# Patient Record
Sex: MALE
[De-identification: names, ages, dates, MRNs, and addresses within clinical notes are randomized per-mention and may not be internally consistent; named-entity substitution may affect disease eponyms.]

---

## 2017-10-02 ENCOUNTER — HOSPITAL ENCOUNTER (EMERGENCY)
Dept: HOSPITAL 14 - H.ER | Age: 22
Discharge: HOME | End: 2017-10-02
Payer: COMMERCIAL

## 2017-10-02 VITALS — HEART RATE: 76 BPM | SYSTOLIC BLOOD PRESSURE: 139 MMHG | DIASTOLIC BLOOD PRESSURE: 81 MMHG | RESPIRATION RATE: 20 BRPM

## 2017-10-02 VITALS — TEMPERATURE: 97.8 F

## 2017-10-02 DIAGNOSIS — N48.9: Primary | ICD-10-CM

## 2017-10-02 LAB
BILIRUB UR-MCNC: NEGATIVE MG/DL
COLOR UR: YELLOW
GLUCOSE UR STRIP-MCNC: (no result) MG/DL
KETONES UR STRIP-MCNC: NEGATIVE MG/DL
LEUKOCYTE ESTERASE UR-ACNC: (no result) LEU/UL
PH UR STRIP: 6 [PH] (ref 5–8)
PROT UR STRIP-MCNC: NEGATIVE MG/DL
RBC # UR STRIP: NEGATIVE /UL
RBC #/AREA URNS HPF: 2 /HPF (ref 0–3)
SP GR UR STRIP: 1.03 (ref 1–1.03)
UROBILINOGEN UR-MCNC: (no result) MG/DL (ref 0.2–1)
WBC #/AREA URNS HPF: 1 /HPF (ref 0–5)

## 2017-10-02 NOTE — ED PDOC
HPI: Male  Pain


Time Seen by Provider: 10/02/17 15:12


Chief Complaint (Nursing): Male Genitourinary


Chief Complaint (Provider): penile rash


History Per: Patient


History/Exam Limitations: no limitations


Onset/Duration Of Symptoms: Days (2)


Current Symptoms Are (Timing): Still Present


Associated Symptoms: denies: Fever, Chills, Nausea, Vomiting, Urinary Symptoms


Additional Complaint(s): 





Had dysuria in August and treated as UTI with cipro. At that time he also had 

associated swelling and redness of glans penis. Improved after 1 day of cipro. 

He completed the course of cipro. Labwork also done by PMD which was negative 

for HIV, Chlamydia, Gonorrhea. He developed "thrush" while on cipro and was 

also given one dose of diflucan and it resolved.





Over last 3 days feels like throat symptoms coming back, and noted red bumps to 

glans near meatus, but no pain or burning.





PMD Stacey





Past Medical History


Reviewed: Historical Data, Nursing Documentation, Vital Signs


Vital Signs: 





 Last Vital Signs











Temp  97.8 F   10/02/17 14:58


 


Pulse  78   10/02/17 14:58


 


Resp  15   10/02/17 14:58


 


BP  177/176 H  10/02/17 14:58


 


Pulse Ox  100   10/02/17 14:58














- Medical History


PMH: No Chronic Diseases





- Surgical History


Surgical History: No Surg Hx





- Family History


Family History: States: No Known Family Hx





- Allergies


Allergies/Adverse Reactions: 


 Allergies











Allergy/AdvReac Type Severity Reaction Status Date / Time


 


ciprofloxacin [From Cipro] Allergy  RASH Verified 10/02/17 14:58














Review of Systems


ROS Statement: Except As Marked, All Systems Reviewed And Found Negative (and 

as per HPI)


ENT: Positive for: Throat Pain


Gastrointestinal: Negative for: Nausea, Vomiting, Abdominal Pain, Diarrhea


Skin: Positive for: Lesions





Physical Exam





- Reviewed


Nursing Documentation Reviewed: Yes


Vital Signs Reviewed: Yes





- Physical Exam


Appears: Positive for: Non-toxic, No Acute Distress


Head Exam: Positive for: ATRAUMATIC, NORMOCEPHALIC


Skin: Positive for: Warm, Dry


ENT: Positive for: Pharynx Is (clear).  Negative for: Pharyngeal Erythema, 

Tonsillar Exudate, Tonsillar Swelling


Gastrointestinal/Abdominal: Positive for: Soft.  Negative for: Tenderness


Male Genital Exam: Positive for: normal genitalia, normal prostate, lesions (

pinpoint flesh colored lesions on glans near meatus, appears to normal skin 

topography).  Negative for: bleeding, erythema, testicular tenderness (R), 

testicular tenderness (L), urethral discharge


Lymphatic: Negative for: Adenopathy


Neurologic/Psych: Positive for: Alert.  Negative for: Motor/Sensory Deficits





- ECG


O2 Sat by Pulse Oximetry: 100





Disposition





- Clinical Impression


Clinical Impression: 


 Penile lesion





Counseled Patient/Family Regarding: Studies Performed, Diagnosis, Need For 

Followup





- Disposition


Referrals: 


González Ibarra [Family Provider] - 10/05/17


Disposition: Routine/Home


Disposition Time: 16:30


Condition: FAIR


Additional Instructions: 


YOUR URINE TODAY WAS NORMAL


YOU WILL BE CONTACTED IF YOUR BLOOD RESULTS ARE ABNORMAL.





PLEASE FOLLOW UP WITH YOUR DOCTOR IN 2-3 DAYS.


Instructions:  Dysuria (ED)

## 2017-10-03 VITALS — OXYGEN SATURATION: 100 %

## 2017-10-29 ENCOUNTER — HOSPITAL ENCOUNTER (EMERGENCY)
Dept: HOSPITAL 14 - H.ER | Age: 22
LOS: 1 days | Discharge: HOME | End: 2017-10-30
Payer: COMMERCIAL

## 2017-10-29 VITALS
DIASTOLIC BLOOD PRESSURE: 94 MMHG | RESPIRATION RATE: 18 BRPM | SYSTOLIC BLOOD PRESSURE: 147 MMHG | TEMPERATURE: 98.9 F | HEART RATE: 77 BPM | OXYGEN SATURATION: 100 %

## 2017-10-29 DIAGNOSIS — B37.9: Primary | ICD-10-CM

## 2017-10-30 NOTE — ED PDOC
HPI: Male  Pain


Time Seen by Provider: 10/29/17 23:15


Chief Complaint (Nursing): Male Genitourinary


Chief Complaint (Provider): rash


History Per: Patient


History/Exam Limitations: no limitations


Associated Symptoms: denies: Fever, Chills, Nausea, Vomiting, Diarrhea, Loss Of 

Appetite, Back Pain, Chest Pain, Constipation, Urinary Symptoms


Additional Complaint(s): 





23yo M in ED for eval of rash to penis x 3 months-states that he was dx with 

yeast infection to glans of penis. had negative STI testing since his last 

sexual intercounter. no fever, pelvic pain nausea or vomiting. denies back pain 

or hemautira. admits to itching to penis and occasional burning with urination. 





Past Medical History


Reviewed: Historical Data, Nursing Documentation, Vital Signs


Vital Signs: 





 Last Vital Signs











Temp  98.9 F   10/29/17 23:28


 


Pulse  77   10/29/17 23:28


 


Resp  18   10/29/17 23:28


 


BP  147/94 H  10/29/17 23:28


 


Pulse Ox  100   10/29/17 23:28














- Medical History


PMH: No Chronic Diseases





- Family History


Family History: States: No Known Family Hx





- Home Medications


Home Medications: 


 Ambulatory Orders











 Medication  Instructions  Recorded


 


Fluconazole [Diflucan] 150 mg PO ONCE #4 tab 10/30/17


 


Nystatin [Mycostatin Cream] 1 applic EXT DAILY #1 tube 10/30/17














- Allergies


Allergies/Adverse Reactions: 


 Allergies











Allergy/AdvReac Type Severity Reaction Status Date / Time


 


apple Allergy  ANAPHYLAXIS Verified 10/29/17 23:28


 


ciprofloxacin [From Cipro] Allergy  RASH Verified 10/02/17 14:58














Review of Systems


ROS Statement: Except As Marked, All Systems Reviewed And Found Negative


Constitutional: Negative for: Fever, Chills


Genitourinary Male: Positive for: Rash.  Negative for: Dysuria, Frequency, 

Incontinence, Hematuria, Penile Discharge, Scrotal Pain, Penile Pain





Physical Exam





- Reviewed


Nursing Documentation Reviewed: Yes


Vital Signs Reviewed: Yes





- Physical Exam


Appears: Positive for: Well, Non-toxic, No Acute Distress


Skin: Positive for: Normal Color, Warm, DRY


Cardiovascular/Chest: Positive for: Regular Rate, Rhythm


Respiratory: Positive for: CNT, Normal Breath Sounds


Gastrointestinal/Abdominal: Positive for: Normal Exam, Bowel Sounds, Soft.  

Negative for: Tenderness


Male Genital Exam: Positive for: other (pt is not circumsised. ).  Negative for

: bleeding, epididymal tenderness, erythema, hernia mass, high riding prostate, 

inguinal tenderness, lesions, scrotum tenderness (R), scrotum tenderness (L), 

testicular tenderness (R), testicular tenderness (L), urethral discharge


Back: Positive for: Normal Inspection


Neurologic/Psych: Positive for: Alert, Oriented





- ECG


O2 Sat by Pulse Oximetry: 100





Medical Decision Making


Medical Decision Making: 





pt most liekly with yeast infection-will treat with diflucan and nystain with 

f.u with urologist. 





Disposition





- Clinical Impression


Clinical Impression: 


 Yeast infection








- Patient ED Disposition


Is Patient to be Admitted: No


Counseled Patient/Family Regarding: Diagnosis, Need For Followup, Rx Given





- Disposition


Referrals: 


González Ibarra [Primary Care Provider] - 


Disposition: Routine/Home


Disposition Time: 00:50


Condition: STABLE


Prescriptions: 


Fluconazole [Diflucan] 150 mg PO ONCE #4 tab


Nystatin [Mycostatin Cream] 1 applic EXT DAILY #1 tube


Instructions:  Skin Yeast Infection (ED)

## 2017-12-30 ENCOUNTER — HOSPITAL ENCOUNTER (INPATIENT)
Dept: HOSPITAL 14 - H.ER | Age: 22
LOS: 7 days | Discharge: HOME | DRG: 347 | End: 2018-01-06
Attending: HOSPITALIST | Admitting: HOSPITALIST
Payer: COMMERCIAL

## 2017-12-30 DIAGNOSIS — Q43.0: Primary | ICD-10-CM

## 2017-12-30 DIAGNOSIS — K50.90: ICD-10-CM

## 2017-12-30 DIAGNOSIS — N50.89: ICD-10-CM

## 2017-12-30 DIAGNOSIS — R42: ICD-10-CM

## 2017-12-30 DIAGNOSIS — K56.2: ICD-10-CM

## 2017-12-30 DIAGNOSIS — D72.829: ICD-10-CM

## 2017-12-30 DIAGNOSIS — R53.1: ICD-10-CM

## 2017-12-30 DIAGNOSIS — K56.690: ICD-10-CM

## 2017-12-30 DIAGNOSIS — I95.1: ICD-10-CM

## 2017-12-30 LAB
ALBUMIN SERPL-MCNC: 4.9 G/DL (ref 3.5–5)
ALBUMIN/GLOB SERPL: 1.6 {RATIO} (ref 1–2.1)
ALT SERPL-CCNC: 43 U/L (ref 21–72)
APTT BLD: 28 SECONDS (ref 25.6–37.1)
AST SERPL-CCNC: 16 U/L (ref 17–59)
BACTERIA #/AREA URNS HPF: (no result) /[HPF]
BASOPHILS # BLD AUTO: 0.1 K/UL (ref 0–0.2)
BASOPHILS NFR BLD: 0.4 % (ref 0–2)
BILIRUB UR-MCNC: NEGATIVE MG/DL
BUN SERPL-MCNC: 11 MG/DL (ref 9–20)
CALCIUM SERPL-MCNC: 9.5 MG/DL (ref 8.4–10.2)
COLOR UR: YELLOW
EOSINOPHIL # BLD AUTO: 0 K/UL (ref 0–0.7)
EOSINOPHIL NFR BLD: 0.1 % (ref 0–4)
ERYTHROCYTE [DISTWIDTH] IN BLOOD BY AUTOMATED COUNT: 12.7 % (ref 11.5–14.5)
GFR NON-AFRICAN AMERICAN: > 60
GIANT PLATELETS BLD QL SMEAR: PRESENT
GLUCOSE UR STRIP-MCNC: (no result) MG/DL
HGB BLD-MCNC: 15.7 G/DL (ref 12–18)
INR PPP: 1 (ref 0.9–1.2)
LEUKOCYTE ESTERASE UR-ACNC: (no result) LEU/UL
LG PLATELETS BLD QL SMEAR: PRESENT
LIPASE SERPL-CCNC: 57 U/L (ref 23–300)
LYMPHOCYTE: 10 % (ref 20–50)
LYMPHOCYTES # BLD AUTO: 0.9 K/UL (ref 1–4.3)
LYMPHOCYTES NFR BLD AUTO: 6 % (ref 20–40)
MCH RBC QN AUTO: 30.5 PG (ref 27–31)
MCHC RBC AUTO-ENTMCNC: 34.8 G/DL (ref 33–37)
MCV RBC AUTO: 87.7 FL (ref 80–94)
MONOCYTE: 4 % (ref 0–10)
MONOCYTES # BLD: 0.5 K/UL (ref 0–0.8)
MONOCYTES NFR BLD: 2.9 % (ref 0–10)
NEUTROPHILS # BLD: 14.4 K/UL (ref 1.8–7)
NEUTROPHILS NFR BLD AUTO: 85 % (ref 42–75)
NEUTROPHILS NFR BLD AUTO: 90.6 % (ref 50–75)
NEUTS BAND NFR BLD: 1 % (ref 0–2)
NRBC BLD AUTO-RTO: 0 % (ref 0–0)
PH UR STRIP: 6 [PH] (ref 5–8)
PLATELET # BLD EST: NORMAL 10*3/UL
PLATELET # BLD: 226 K/UL (ref 130–400)
PMV BLD AUTO: 9.5 FL (ref 7.2–11.7)
PROT UR STRIP-MCNC: NEGATIVE MG/DL
PROTHROMBIN TIME: 11 SECONDS (ref 9.8–13.1)
RBC # BLD AUTO: 5.13 MIL/UL (ref 4.4–5.9)
RBC # UR STRIP: NEGATIVE /UL
SP GR UR STRIP: 1.02 (ref 1–1.03)
SQUAMOUS EPITHIAL: 1 /HPF (ref 0–5)
TOTAL CELLS COUNTED BLD: 100
URINE CLARITY: (no result)
URINE NITRATE: NEGATIVE
UROBILINOGEN UR-MCNC: (no result) MG/DL (ref 0.2–1)
WBC # BLD AUTO: 15.9 K/UL (ref 4.8–10.8)

## 2017-12-30 NOTE — CT
PROCEDURE:  CT Abdomen and Pelvis with contrast



HISTORY:

diffuse severe abd pain, guarding



COMPARISON:

None.



TECHNIQUE:

Contrast dose: 95 mL Omnipaque 300



Radiation dose:



Total exam DLP = 730.24 mGy-cm.



This CT exam was performed using one or more of the following dose 

reduction techniques: Automated exposure control, adjustment of the 

mA and/or kV according to patient size, and/or use of iterative 

reconstruction technique.



FINDINGS:



LOWER THORAX:

Unremarkable. 



LIVER:

Unremarkable. No gross lesion or ductal dilatation. 



GALLBLADDER AND BILE DUCTS:

Unremarkable. 



PANCREAS:

Unremarkable. No gross lesion or ductal dilatation.



SPLEEN:

Unremarkable. 



ADRENALS:

Unremarkable. No mass. 



KIDNEYS AND URETERS:

Unremarkable. No hydronephrosis. No solid mass. 



VASCULATURE:

Unremarkable. No aortic aneurysm. 



BOWEL:

There is probable mechanical small bowel obstruction.  Dilated loops 

of small bowel with liquified content are seen with a transition 

point identified in the lower central abdomen, best visualized on 

images 103-106. This is in the mid ileum. Collapsed ileal loops are 

seen distal to this point.  There is edema of the ileal mesentery. l 

there is mild sigmoid diverticulosis.  There is no evidence of 

diverticulitis.  There are no other abnormal bowel loops identified.



APPENDIX:

Normal appendix. 



PERITONEUM:

Unremarkable. No free fluid. No free air. 



LYMPH NODES:

Shotty subcentimeter lymph nodes are identified within the small 

bowel mesenteric and medial to the cecum and ascending colon.  There 

is no retroperitoneal or pelvic lymphadenopathy. 



BLADDER:

Unremarkable. 



REPRODUCTIVE:

Normal prostate 



BONES:

No acute fracture. 



OTHER FINDINGS:

None.



IMPRESSION:

Findings consistent with mechanical small bowel obstruction of the 

mid ileal region. No evidence of bowel perforation. No other acute 

abnormality identified.

## 2017-12-30 NOTE — ED PDOC
HPI: Abdomen


Time Seen by Provider: 12/30/17 07:15


Chief Complaint (Nursing): Abdominal Pain


Chief Complaint (Provider): Abdominal pain


History Per: Patient


History/Exam Limitations: no limitations


Onset/Duration Of Symptoms: Hrs (5 hours ago)


Current Symptoms Are (Timing): Still Present


Severity: Severe


Additional Complaint(s): 


21 y/o male presents to the ED with severe central abdominal pain, radiating to 

the epigastric, onset of 5 hours. Patient has experienced several episodes of 

vomiting and has been unable to move his bowels since yesterday. He also 

reports of having similar pain in the past. He denies fever, urinary symptoms, 

or back pain. 





PCP: González Ibarra








Past Medical History


Reviewed: Historical Data, Nursing Documentation, Vital Signs


Vital Signs: 


 Last Vital Signs











Temp  97.1 F L  12/30/17 07:16


 


Pulse  82   12/30/17 07:16


 


Resp  18   12/30/17 07:22


 


BP  152/92 H  12/30/17 07:16


 


Pulse Ox  100   12/30/17 10:26














- Medical History


PMH: No Chronic Diseases





- Surgical History


Surgical History: No Surg Hx





- Family History


Family History: States: Unknown Family Hx





- Social History


Current smoker - smoking cessation education provided: No


Ex-Smoker (has not smoked in the last 12 months): No


Alcohol: None


Drugs: Denies





- Home Medications


Home Medications: 


 Ambulatory Orders











 Medication  Instructions  Recorded


 


No Known Home Med  12/30/17














- Allergies


Allergies/Adverse Reactions: 


 Allergies











Allergy/AdvReac Type Severity Reaction Status Date / Time


 


apple Allergy  ANAPHYLAXIS Verified 12/30/17 07:22


 


ciprofloxacin [From Cipro] Allergy  RASH Verified 12/30/17 07:22














Review of Systems


ROS Statement: Except As Marked, All Systems Reviewed And Found Negative


Constitutional: Negative for: Fever


Gastrointestinal: Positive for: Vomiting, Abdominal Pain (central, radiating to 

epigastirc), Constipation


Genitourinary Male: Negative for: Dysuria


Musculoskeletal: Negative for: Back Pain





Physical Exam





- Reviewed


Nursing Documentation Reviewed: Yes


Vital Signs Reviewed: Yes





- Physical Exam


Appears: Positive for: Non-toxic, In Acute Distress (in painful distress)


Head Exam: Positive for: ATRAUMATIC


Skin: Positive for: Normal Color, Warm


Eye Exam: Positive for: Normal appearance, EOMI, PERRL


ENT: Positive for: Normal ENT Inspection


Neck: Positive for: Normal, Painless ROM, Supple


Cardiovascular/Chest: Positive for: Regular Rate, Rhythm.  Negative for: Murmur


Respiratory: Positive for: Normal Breath Sounds.  Negative for: Respiratory 

Distress


Gastrointestinal/Abdominal: Positive for: Normal Exam, Tenderness (diffusely 

tender), Guarding


Back: Positive for: Normal Inspection


Extremity: Positive for: Normal ROM.  Negative for: Pedal Edema, Deformity


Neurologic/Psych: Positive for: Alert, Oriented.  Negative for: Motor/Sensory 

Deficits





- Laboratory Results


Result Diagrams: 


 12/30/17 08:25





 12/30/17 08:25





- ECG


O2 Sat by Pulse Oximetry: 100 (RA)


Pulse Ox Interpretation: Normal





Medical Decision Making


Medical Decision Making: 


Time:


--07:44


Impression: 


--21 y/o with abdominal pain


Plan:


--CT ABD & Pelvis IV Contrast


--Labs


--Ed Urine Dip


--Partial Thrombobin Time


--Prothrombin time


--chest X-ray


--Morphine 2mg IV


--Iv fluids


--Zofran 4mg


--Glucose, Blood, POC


--Urinalysis





labs reviewed, reveal elevated WBC, normal Hgb, lipase and LFTs





CT:





Accession No. : [id number]


Patient Name / ID : LUIS M BERMUDEZ  / [id number]


Exam Date : 12/30/2017 09:40:09 ( Approved )


Study Comment : 


Sex / Age : M  / 22Y





Creator : Ranjeet Chávez MD


Dictator : Ranjeet Chávez MD


 : 


Approver : Ranjeet Chávez MD


Approver2 : 





Report Date : 12/30/2017 10:23:43


My Comment : 


********************************************************************************

***





PROCEDURE:  CT Abdomen and Pelvis with contrast





HISTORY:


diffuse severe abd pain, guarding





COMPARISON:


None.





TECHNIQUE:


Contrast dose: 95 mL Omnipaque 300





Radiation dose:





Total exam DLP = 730.24 mGy-cm.





This CT exam was performed using one or more of the following dose reduction 

techniques: Automated exposure control, adjustment of the mA and/or kV 

according to patient size, and/or use of iterative reconstruction technique.





FINDINGS:





LOWER THORAX:


Unremarkable. 





LIVER:


Unremarkable. No gross lesion or ductal dilatation. 





GALLBLADDER AND BILE DUCTS:


Unremarkable. 





PANCREAS:


Unremarkable. No gross lesion or ductal dilatation.





SPLEEN:


Unremarkable. 





ADRENALS:


Unremarkable. No mass. 





KIDNEYS AND URETERS:


Unremarkable. No hydronephrosis. No solid mass. 





VASCULATURE:


Unremarkable. No aortic aneurysm. 





BOWEL:


There is probable mechanical small bowel obstruction.  Dilated loops of small 

bowel with liquified content are seen with a transition point identified in the 

lower central abdomen, best visualized on images 103-106. This is in the mid 

ileum. Collapsed ileal loops are seen distal to this point.  There is edema of 

the ileal mesentery. l there is mild sigmoid diverticulosis.  There is no 

evidence of diverticulitis.  There are no other abnormal bowel loops identified.





APPENDIX:


Normal appendix. 





PERITONEUM:


Unremarkable. No free fluid. No free air. 





LYMPH NODES:


Shotty subcentimeter lymph nodes are identified within the small bowel 

mesenteric and medial to the cecum and ascending colon.  There is no 

retroperitoneal or pelvic lymphadenopathy. 





BLADDER:


Unremarkable. 





REPRODUCTIVE:


Normal prostate 





BONES:


No acute fracture. 





OTHER FINDINGS:


None.





IMPRESSION:


Findings consistent with mechanical small bowel obstruction of the mid ileal 

region. No evidence of bowel perforation. No other acute abnormality identified.





-------------------------------








NGT ordered


Reassess- required additional pain medicine.


1030a d/w surgical resident 


1050a d/w attending surgeon Dr Menendez, clinical presentation, lab results and CT 

findings discussed. He recommended GI consult for possibility crohns disease. 











Scribe Attestation:


Documented by Sekou Fisher acting as a scribe for Benji Garcia DO. 








Disposition





- Disposition

## 2017-12-30 NOTE — RAD
HISTORY:

SOB  



COMPARISON:

No prior. 



FINDINGS:



LUNGS:

No active pulmonary disease.



PLEURA:

No significant pleural effusion identified, no pneumothorax apparent.



CARDIOVASCULAR:

Normal.



OSSEOUS STRUCTURES:

No significant abnormalities.



VISUALIZED UPPER ABDOMEN:

Normal.



OTHER FINDINGS:

None.



IMPRESSION:

No active disease.

## 2017-12-30 NOTE — CP.PCM.CON
History of Present Illness





- History of Present Illness


History of Present Illness: 





General Surgery:  Dr Menendez





pt is a 22M with no PMH who presents with sudden onset abdominal pain.  Pt 

states he was sleeping and at 2AM was awoken by intense cramping abdominal pain

, located mainly in the suprapubic region by radiating up to the epigastrium, 10

/10.  Pt states he also had 3 episodes of emesis, primarily consisting of 

saliva with minimal chunks of previously eaten food.  He denies any bile in the 

vomit but does state there were small streaks of blood after the 2nd time.  He 

had one episode of emesis in the ED that again was just saliva.  Pt states he 

last had a BM yesterday morning, and was passing gas throughout the day.  

Denies any f/c, sob or chest pain.  Per ED physician pt was in extreme pain and 

guarding upon presentation.  He has since been medicated for his discomfort.  

Pt reports pain is still present but now a 4/10.  He was able to doze off after 

pain medication.





PMH:  none


PSH: none


No fam hx of GI related issues including but not limited to Crohn's, UC or 

cancer.





Review of Systems





- Review of Systems


All systems: reviewed and no additional remarkable complaints except (as per HPI

)





Past Patient History





- Past Social History


Alcohol: None


Drugs: Denies





- PSYCHIATRIC


Hx Substance Use: No





- SURGICAL HISTORY


Hx Surgeries: No





- ANESTHESIA


Hx Anesthesia: No





Meds


Allergies/Adverse Reactions: 


 Allergies











Allergy/AdvReac Type Severity Reaction Status Date / Time


 


apple Allergy  ANAPHYLAXIS Verified 12/30/17 07:22


 


ciprofloxacin [From Cipro] Allergy  RASH Verified 12/30/17 07:22














- Medications


Medications: 


 Current Medications





Lactated Ringer's (Lactated Ringer's)  1,000 mls @ 100 mls/hr IV .Q10H ANNA


Morphine Sulfate (Morphine)  4 mg IVP Q4 PRN


   PRN Reason: Pain, severe (8-10)


Morphine Sulfate (Morphine)  2 mg IVP Q4 PRN


   PRN Reason: Pain, moderate (4-7)


Ondansetron HCl (Zofran Inj)  4 mg IVP Q6 PRN


   PRN Reason: Nausea/Vomiting











Physical Exam





- Constitutional


Appears: Non-toxic, No Acute Distress





- Head Exam


Head Exam: NORMAL INSPECTION





- ENT Exam


ENT Exam: Mucous Membranes Moist





- Respiratory Exam


Respiratory Exam: absent: Accessory Muscle Use, Respiratory Distress





- Cardiovascular Exam


Cardiovascular Exam: REGULAR RHYTHM.  absent: Tachycardia





- GI/Abdominal Exam


GI & Abdominal Exam: Distended (minimally), Soft, Tenderness (suprapubic and 

RLQ mainly).  absent: Firm, Guarding, Hernia, Mass, Rebound, Rigid





- Rectal Exam


Rectal Exam: Deferred





- Extremities Exam


Extremities exam: Negative for: calf tenderness, pedal edema





- Back Exam


Back exam: absent: CVA tenderness (L), CVA tenderness (R)





- Neurological Exam


Neurological exam: Alert, Oriented x3





- Psychiatric Exam


Psychiatric exam: Normal Affect, Normal Mood





- Skin


Skin Exam: Dry, Intact, Normal Color, Warm





Results





- Vital Signs


Recent Vital Signs: 


 Last Vital Signs











Temp  97.1 F L  12/30/17 07:16


 


Pulse  82   12/30/17 07:16


 


Resp  18   12/30/17 07:22


 


BP  152/92 H  12/30/17 07:16


 


Pulse Ox  100   12/30/17 10:57














- Labs


Result Diagrams: 


 12/30/17 08:25





 12/30/17 08:25


Labs: 


 Laboratory Results - last 24 hr











  12/30/17 12/30/17 12/30/17





  08:25 08:25 08:25


 


WBC  15.9 H  


 


RBC  5.13  


 


Hgb  15.7  


 


Hct  45.0  


 


MCV  87.7  


 


MCH  30.5  


 


MCHC  34.8  


 


RDW  12.7  


 


Plt Count  226  


 


MPV  9.5  


 


Neut % (Auto)  90.6 H  


 


Lymph % (Auto)  6.0 L  


 


Mono % (Auto)  2.9  


 


Eos % (Auto)  0.1  


 


Baso % (Auto)  0.4  


 


Neut #  14.4 H  


 


Lymph #  0.9 L  


 


Mono #  0.5  


 


Eos #  0.0  


 


Baso #  0.1  


 


Neutrophils % (Manual)  85 H  


 


Band Neutrophils %  1  


 


Lymphocytes % (Manual)  10 L  


 


Monocytes % (Manual)  4  


 


Platelet Estimate  Normal  


 


Large Platelets  Present  


 


Giant Platelets  Present  


 


PT    11.0


 


INR    1.0


 


APTT    28.0


 


Sodium   136 


 


Potassium   3.7 


 


Chloride   99 


 


Carbon Dioxide   27 


 


Anion Gap   14 


 


BUN   11 


 


Creatinine   0.7 L 


 


Est GFR ( Amer)   > 60 


 


Est GFR (Non-Af Amer)   > 60 


 


Random Glucose   138 H 


 


Calcium   9.5 


 


Total Bilirubin   0.8 


 


AST   16 L 


 


ALT   43 


 


Alkaline Phosphatase   85 


 


Total Protein   8.0 


 


Albumin   4.9 


 


Globulin   3.1 


 


Albumin/Globulin Ratio   1.6 


 


Lipase   57 


 


Urine Color   


 


Urine Clarity   


 


Urine pH   


 


Ur Specific Gravity   


 


Urine Protein   


 


Urine Glucose (UA)   


 


Urine Ketones   


 


Urine Blood   


 


Urine Nitrate   


 


Urine Bilirubin   


 


Urine Urobilinogen   


 


Ur Leukocyte Esterase   


 


Urine Microscopic WBC   


 


Ur Squamous Epith Cells   


 


Urine Bacteria   














  12/30/17





  08:25


 


WBC 


 


RBC 


 


Hgb 


 


Hct 


 


MCV 


 


MCH 


 


MCHC 


 


RDW 


 


Plt Count 


 


MPV 


 


Neut % (Auto) 


 


Lymph % (Auto) 


 


Mono % (Auto) 


 


Eos % (Auto) 


 


Baso % (Auto) 


 


Neut # 


 


Lymph # 


 


Mono # 


 


Eos # 


 


Baso # 


 


Neutrophils % (Manual) 


 


Band Neutrophils % 


 


Lymphocytes % (Manual) 


 


Monocytes % (Manual) 


 


Platelet Estimate 


 


Large Platelets 


 


Giant Platelets 


 


PT 


 


INR 


 


APTT 


 


Sodium 


 


Potassium 


 


Chloride 


 


Carbon Dioxide 


 


Anion Gap 


 


BUN 


 


Creatinine 


 


Est GFR ( Amer) 


 


Est GFR (Non-Af Amer) 


 


Random Glucose 


 


Calcium 


 


Total Bilirubin 


 


AST 


 


ALT 


 


Alkaline Phosphatase 


 


Total Protein 


 


Albumin 


 


Globulin 


 


Albumin/Globulin Ratio 


 


Lipase 


 


Urine Color  Yellow


 


Urine Clarity  Slighty-cloudy


 


Urine pH  6.0


 


Ur Specific Gravity  1.018


 


Urine Protein  Negative


 


Urine Glucose (UA)  Neg


 


Urine Ketones  Negative


 


Urine Blood  Negative


 


Urine Nitrate  Negative


 


Urine Bilirubin  Negative


 


Urine Urobilinogen  0.2-1.0


 


Ur Leukocyte Esterase  Neg


 


Urine Microscopic WBC  1


 


Ur Squamous Epith Cells  1


 


Urine Bacteria  Rare














Assessment & Plan





- Assessment and Plan (Free Text)


Assessment: 





22M with undifferentiated abdominal pain;  ddx enteritis vs Crohn's vs sbo


Plan: 





pt last had BM yesterday and emesis has been non-billous; unlikely fully 

obstructed


   -will hold off on NGT unless further vomiting occurs


   -NPO, IV Fluids


consideration to first episode of Crohn's should be given; especially with 

stricture noted on CT


   -recommend GI consult for Crohn's work-up and possible colonoscopy in the 

future


No immediate surgical intervention planned





d/w Dr Shon Gamez, PGY3

## 2017-12-31 LAB
ALBUMIN SERPL-MCNC: 4.4 G/DL (ref 3.5–5)
ALBUMIN/GLOB SERPL: 1.4 {RATIO} (ref 1–2.1)
ALT SERPL-CCNC: 36 U/L (ref 21–72)
AST SERPL-CCNC: 13 U/L (ref 17–59)
BUN SERPL-MCNC: 8 MG/DL (ref 9–20)
CALCIUM SERPL-MCNC: 9.5 MG/DL (ref 8.4–10.2)
ERYTHROCYTE [DISTWIDTH] IN BLOOD BY AUTOMATED COUNT: 12.6 % (ref 11.5–14.5)
GFR NON-AFRICAN AMERICAN: > 60
HGB BLD-MCNC: 15.9 G/DL (ref 12–18)
MCH RBC QN AUTO: 30.3 PG (ref 27–31)
MCHC RBC AUTO-ENTMCNC: 34.4 G/DL (ref 33–37)
MCV RBC AUTO: 87.9 FL (ref 80–94)
PLATELET # BLD: 212 K/UL (ref 130–400)
RBC # BLD AUTO: 5.25 MIL/UL (ref 4.4–5.9)
WBC # BLD AUTO: 11.5 K/UL (ref 4.8–10.8)

## 2017-12-31 NOTE — CP.PCM.HP
History of Present Illness





- History of Present Illness


History of Present Illness: 





CC: Abdominal pain





This is a 22 year old male with no significant past medical history who came 

into the ED complaining of sudden onset abdominal pain located in the 

epigastrium, starting 5 hours before arrival to the ED. The patient states that 

the pain is severe, sharp, and constant. He says the pain is associated with 

nonbilious emesis. He vomited several times, once with blood tinged emesis. He 

says he noticed only trace amount of blood the first time he vomited and 

afterwards did not noticed any blood. He reports having similar pain like this 

in the past. He denies any associated fever, cp, sob, back pain, or urinary sx. 

In the ED, the patient was unable to tolerate any po. A CT scan was done 

revealing mechanical SBO located in the ileum. Surgery saw the patient and 

stated there was no need for NG tube at this time and will continue to monitor. 

Dr. Holliday was called for GI as well as there was concern for possible Crohn'

s stricture. The patient has never had abdominal surgery in the past. 





Present on Admission





- Present on Admission


Any Indicators Present on Admission: No





Review of Systems





- Review of Systems


Review of Systems: 





A 12 point review of systems was conducted and found to be negative other than 

what was mentioned in the HPI. 





Past Patient History





- Infectious Disease


Hx of Infectious Diseases: None





- Past Medical History & Family History


Past Medical History?: No





- Past Social History


Smoking Status: Never Smoked





- CARDIAC


Hx Cardiac Disorders: No





- PULMONARY


Hx Respiratory Disorders: No





- NEUROLOGICAL


Hx Neurological Disorder: No





- HEENT


Hx HEENT Problems: No





- RENAL


Hx Chronic Kidney Disease: No





- ENDOCRINE/METABOLIC


Hx Endocrine Disorders: No





- HEMATOLOGICAL/ONCOLOGICAL


Hx Blood Disorders: No





- INTEGUMENTARY


Hx Dermatological Problems: No





- MUSCULOSKELETAL/RHEUMATOLOGICAL


Hx Musculoskeletal Disorders: No


Hx Falls: No





- GASTROINTESTINAL


Hx Gastrointestinal Disorders: No





- GENITOURINARY/GYNECOLOGICAL


Hx Genitourinary Disorders: No





- PSYCHIATRIC


Hx Psychophysiologic Disorder: No


Hx Substance Use: No





- SURGICAL HISTORY


Hx Surgeries: No





- ANESTHESIA


Hx Anesthesia: No





Meds


Allergies/Adverse Reactions: 


 Allergies











Allergy/AdvReac Type Severity Reaction Status Date / Time


 


apple Allergy  ANAPHYLAXIS Verified 12/30/17 07:22


 


ciprofloxacin [From Cipro] Allergy  RASH Verified 12/30/17 07:22














Physical Exam





- Additional Findings


Additional findings: 








Physical exam:





Constitutional- cooperative, awake, alert


Head- NCAT, PERRL


Eye- PERRL, EOMI


ENT- normal exam, MMM.


Neck- normal inspection, supple, no JVD


Respiratory- CTAB, no wheezes rales rhonchi


Cardiovascular- RRR, +S1, +S2 no MRG


GI/Abdominal- + Tenderness to palpation of the epigastrium. hypoactive bowel 

sounds, soft, no mass, no hsm


Skin- warm, dry. + Multiple tattoos


Extremities Exam- normal capillary refill, normal inspection


Neurological Exam- alert, awake, oriented


Psych- normal mood, normal affect





Results





- Vital Signs


Recent Vital Signs: 





 Last Vital Signs











Temp  98.8 F   12/31/17 16:00


 


Pulse  89   12/31/17 16:00


 


Resp  18   12/31/17 16:00


 


BP  141/84   12/31/17 16:00


 


Pulse Ox  97   12/31/17 16:00














- Labs


Result Diagrams: 


 12/31/17 05:30





 12/31/17 05:30


Labs: 





 Laboratory Results - last 24 hr











  12/31/17 12/31/17





  05:30 05:30


 


WBC  11.5 H 


 


RBC  5.25 


 


Hgb  15.9 


 


Hct  46.1 


 


MCV  87.9 


 


MCH  30.3 


 


MCHC  34.4 


 


RDW  12.6 


 


Plt Count  212 


 


Sodium   135


 


Potassium   4.0


 


Chloride   98


 


Carbon Dioxide   30


 


Anion Gap   11


 


BUN   8 L


 


Creatinine   0.7 L


 


Est GFR ( Amer)   > 60


 


Est GFR (Non-Af Amer)   > 60


 


Random Glucose   114 H


 


Calcium   9.5


 


Total Bilirubin   1.5 H


 


AST   13 L


 


ALT   36


 


Alkaline Phosphatase   75


 


Total Protein   7.4


 


Albumin   4.4


 


Globulin   3.1


 


Albumin/Globulin Ratio   1.4














Assessment & Plan





- Assessment and Plan (Free Text)


Plan: 





ASSESSMENT/PLAN





1) Mechanical SBO, r/o Crohn's, other causes of SBO 


- Admit to med/surg


- NPO status


- GI consultation with Dr. Holliday


- Surgical consultation with Dr. Menendez


- IV fluids, LR @ 100 cc/hour


- Morphine PRN for pain


- Zofran PRN for N/V


- Protonix 40 mg iv daily





2) DVT prophylaxis


- SCDs





- Date & Time


Date: 12/30/17


Time: 20:00

## 2017-12-31 NOTE — CP.PCM.PN
Subjective





- Date & Time of Evaluation


Date of Evaluation: 12/31/17


Time of Evaluation: 11:15





- Subjective


Subjective: 





No fever


abd pain better


+ nausea but no vomiting today


+ flatus at about 10am


no BM


no CP


no SOB


denies diarrhea nor constipation





Objective





- Vital Signs/Intake and Output


Vital Signs (last 24 hours): 


 











Temp Pulse Resp BP Pulse Ox


 


 98.8 F   89   18   141/84   97 


 


 12/31/17 16:00  12/31/17 16:00  12/31/17 16:00  12/31/17 16:00  12/31/17 16:00











- Medications


Medications: 


 Current Medications





Lactated Ringer's (Lactated Ringer's)  1,000 mls @ 100 mls/hr IV .Q10H [facility]


   Last Admin: 12/31/17 17:10 Dose:  100 mls/hr


Morphine Sulfate (Morphine)  4 mg IVP Q4 PRN


   PRN Reason: Pain, severe (8-10)


Morphine Sulfate (Morphine)  2 mg IVP Q4 PRN


   PRN Reason: Pain, moderate (4-7)


   Last Admin: 12/31/17 17:08 Dose:  2 mg


Ondansetron HCl (Zofran Inj)  4 mg IVP Q6 PRN


   PRN Reason: Nausea/Vomiting


   Last Admin: 12/30/17 17:17 Dose:  4 mg


Pantoprazole Sodium (Protonix Inj)  40 mg IVP DAILY [facility]


   Last Admin: 12/31/17 10:49 Dose:  40 mg











- Labs


Labs: 


 





 12/31/17 05:30 





 12/31/17 05:30 





 











PT  11.0 Seconds (9.8-13.1)   12/30/17  08:25    


 


INR  1.0  (0.9-1.2)   12/30/17  08:25    


 


APTT  28.0 Seconds (25.6-37.1)   12/30/17  08:25    














- Constitutional


Appears: No Acute Distress





- Head Exam


Head Exam: NORMAL INSPECTION, NORMOCEPHALIC





- Eye Exam


Eye Exam: EOMI, Normal appearance, PERRL


Pupil Exam: NORMAL ACCOMODATION





- ENT Exam


ENT Exam: Mucous Membranes Moist, Normal External Ear Exam





- Neck Exam


Neck Exam: Full ROM.  absent: Meningismus





- Respiratory Exam


Respiratory Exam: NORMAL BREATHING PATTERN.  absent: Rales, Wheezes, 

Respiratory Distress





- Cardiovascular Exam


Cardiovascular Exam: REGULAR RHYTHM, +S1, +S2





- GI/Abdominal Exam


GI & Abdominal Exam: Soft, Tenderness, Hypoactive Bowel Sounds





- Extremities Exam


Extremities Exam: Full ROM, Normal Capillary Refill.  absent: Calf Tenderness





- Back Exam


Back Exam: absent: CVA tenderness (L), CVA tenderness (R)





- Neurological Exam


Neurological Exam: Alert, Awake, CN II-XII Intact, Normal Gait, Oriented x3


Neuro motor strength exam: Left Upper Extremity: 5, Right Upper Extremity: 5, 

Left Lower Extremity: 5, Right Lower Extremity: 5





- Psychiatric Exam


Psychiatric exam: Normal Affect, Normal Mood





- Skin


Skin Exam: Dry, Normal Color, Warm





Assessment and Plan


(1) SBO (small bowel obstruction)


Status: Acute   





- Assessment and Plan (Free Text)


Assessment: 











21 y/o gent, no significant PMH, came in bec of abd pain accompanied by nausea 

and vomiting.  Denies fever, no hx of abd surgery, no previous  hx of  abd pains

, diarrhea nor constipation.


CT of abd: Mechanical small bowel obstruction





1. SBO


- unclear etiology


- NPO


- NGT not inserted , pt no longer =vomiting


-IVF hydration


- GI consult


- Surgery consult


- Pain mgt





2. Leukocytosis prob reactive


- pt afebrile


- will cont to monitor

## 2017-12-31 NOTE — CP.PCM.CON
History of Present Illness





- History of Present Illness


History of Present Illness: 





23 yo male presenting with severe midabdominal pain and vomiting. Also for one 

day had difficulty moving bowels. Similar events have happened before.





Review of Systems





- Constitutional


Constitutional: absent: Chills





- EENT


Eyes: absent: Blurred Vision


Nose/Mouth/Throat: absent: Epistaxis





- Cardiovascular


Cardiovascular: absent: Chest Pain





- Genitourinary


Genitourinary: As Per HPI





Past Patient History





- Infectious Disease


Hx of Infectious Diseases: None





- Past Medical History & Family History


Past Medical History?: No





- Past Social History


Smoking Status: Never Smoked





- CARDIAC


Hx Cardiac Disorders: No





- PULMONARY


Hx Respiratory Disorders: No





- NEUROLOGICAL


Hx Neurological Disorder: No





- HEENT


Hx HEENT Problems: No





- RENAL


Hx Chronic Kidney Disease: No





- ENDOCRINE/METABOLIC


Hx Endocrine Disorders: No





- HEMATOLOGICAL/ONCOLOGICAL


Hx Blood Disorders: No





- INTEGUMENTARY


Hx Dermatological Problems: No





- MUSCULOSKELETAL/RHEUMATOLOGICAL


Hx Musculoskeletal Disorders: No


Hx Falls: No





- GASTROINTESTINAL


Hx Gastrointestinal Disorders: No





- GENITOURINARY/GYNECOLOGICAL


Hx Genitourinary Disorders: No





- PSYCHIATRIC


Hx Psychophysiologic Disorder: No


Hx Substance Use: No





- SURGICAL HISTORY


Hx Surgeries: No





- ANESTHESIA


Hx Anesthesia: No





Meds


Allergies/Adverse Reactions: 


 Allergies











Allergy/AdvReac Type Severity Reaction Status Date / Time


 


apple Allergy  ANAPHYLAXIS Verified 12/30/17 07:22


 


ciprofloxacin [From Cipro] Allergy  RASH Verified 12/30/17 07:22














- Medications


Medications: 


 Current Medications





Lactated Ringer's (Lactated Ringer's)  1,000 mls @ 100 mls/hr IV .Q10H [facility]


   Last Admin: 12/31/17 17:10 Dose:  100 mls/hr


Morphine Sulfate (Morphine)  4 mg IVP Q4 PRN


   PRN Reason: Pain, severe (8-10)


Morphine Sulfate (Morphine)  2 mg IVP Q4 PRN


   PRN Reason: Pain, moderate (4-7)


   Last Admin: 12/31/17 17:08 Dose:  2 mg


Ondansetron HCl (Zofran Inj)  4 mg IVP Q6 PRN


   PRN Reason: Nausea/Vomiting


   Last Admin: 12/30/17 17:17 Dose:  4 mg


Pantoprazole Sodium (Protonix Inj)  40 mg IVP DAILY [facility]


   Last Admin: 12/31/17 10:49 Dose:  40 mg











Physical Exam





- Head Exam


Head Exam: ATRAUMATIC





- Eye Exam


Eye Exam: Normal appearance, PERRL





- Respiratory Exam


Respiratory Exam: Clear to Auscultation Bilateral





- Cardiovascular Exam


Cardiovascular Exam: Diastolic murmur, REGULAR RHYTHM, +S1, +S2





- GI/Abdominal Exam


GI & Abdominal Exam: Normal Bowel Sounds.  absent: Distended





Results





- Vital Signs


Recent Vital Signs: 


 Last Vital Signs











Temp  98.8 F   12/31/17 16:00


 


Pulse  89   12/31/17 16:00


 


Resp  18   12/31/17 16:00


 


BP  141/84   12/31/17 16:00


 


Pulse Ox  97   12/31/17 16:00














- Labs


Result Diagrams: 


 12/31/17 05:30





 12/31/17 05:30


Labs: 


 Laboratory Results - last 24 hr











  12/31/17 12/31/17





  05:30 05:30


 


WBC  11.5 H 


 


RBC  5.25 


 


Hgb  15.9 


 


Hct  46.1 


 


MCV  87.9 


 


MCH  30.3 


 


MCHC  34.4 


 


RDW  12.6 


 


Plt Count  212 


 


Sodium   135


 


Potassium   4.0


 


Chloride   98


 


Carbon Dioxide   30


 


Anion Gap   11


 


BUN   8 L


 


Creatinine   0.7 L


 


Est GFR ( Amer)   > 60


 


Est GFR (Non-Af Amer)   > 60


 


Random Glucose   114 H


 


Calcium   9.5


 


Total Bilirubin   1.5 H


 


AST   13 L


 


ALT   36


 


Alkaline Phosphatase   75


 


Total Protein   7.4


 


Albumin   4.4


 


Globulin   3.1


 


Albumin/Globulin Ratio   1.4














Assessment & Plan


(1) Abdominal pain


Assessment and Plan: 


Infectious enteritis vs possible Crohns of midileum. Clinically doesn't appear 

obstructed. Follow clinically. MR enterography if symptoms persist. Maintain 

well hydrated.


Status: Acute

## 2018-01-01 NOTE — CP.PCM.PN
<Tanya Florian - Last Filed: 01/01/18 09:04>





Subjective





- Date & Time of Evaluation


Date of Evaluation: 01/01/18


Time of Evaluation: 06:05





- Subjective


Subjective: 





General surgery progress note for Dr. Menendez-Tanya Florian, PGY-1





Pt S & E this AM. 





Pt reports continued ab pain/bloating since yesterday.  Last BM Fri AM prior to 

admission, usual habit is daily. Small amount of flatus x 1 yesterday. Admits 

to some nausea, no emesis since Sat AM. States he had similar episode a few yrs 

ago that resolved w/rectal decompression.  Denies F & C. Per nursing - pt 

requiring pain meds overnight. Denies hx of ab surgery.








Objective





- Vital Signs/Intake and Output


Vital Signs (last 24 hours): 


 











Temp Pulse Resp BP Pulse Ox


 


 98.1 F   78   20   138/77   94 L


 


 01/01/18 08:14  01/01/18 08:14  01/01/18 08:14  01/01/18 08:14  01/01/18 08:14











- Medications


Medications: 


 Current Medications





Lactated Ringer's (Lactated Ringer's)  1,000 mls @ 100 mls/hr IV .Q10H [facility]


   Last Admin: 01/01/18 05:10 Dose:  100 mls/hr


Morphine Sulfate (Morphine)  4 mg IVP Q4 PRN


   PRN Reason: Pain, severe (8-10)


   Last Admin: 01/01/18 05:11 Dose:  4 mg


Morphine Sulfate (Morphine)  2 mg IVP Q4 PRN


   PRN Reason: Pain, moderate (4-7)


   Last Admin: 12/31/17 17:08 Dose:  2 mg


Ondansetron HCl (Zofran Inj)  4 mg IVP Q6 PRN


   PRN Reason: Nausea/Vomiting


   Last Admin: 12/30/17 17:17 Dose:  4 mg


Pantoprazole Sodium (Protonix Inj)  40 mg IVP DAILY [facility]


   Last Admin: 12/31/17 10:49 Dose:  40 mg











- Labs


Labs: 


 





 12/31/17 05:30 





 12/31/17 05:30 





 











PT  11.0 Seconds (9.8-13.1)   12/30/17  08:25    


 


INR  1.0  (0.9-1.2)   12/30/17  08:25    


 


APTT  28.0 Seconds (25.6-37.1)   12/30/17  08:25    














- Constitutional


Appears: Non-toxic, No Acute Distress





- Head Exam


Head Exam: ATRAUMATIC, NORMAL INSPECTION, NORMOCEPHALIC





- Eye Exam


Eye Exam: EOMI, Normal appearance





- ENT Exam


ENT Exam: Mucous Membranes Moist, Normal Exam





- Neck Exam


Neck Exam: Full ROM, Normal Inspection





- Respiratory Exam


Respiratory Exam: Clear to Ausculation Bilateral, NORMAL BREATHING PATTERN





- Cardiovascular Exam


Cardiovascular Exam: REGULAR RHYTHM, +S1, +S2





- GI/Abdominal Exam


GI & Abdominal Exam: Distended (mild), Soft, Tenderness (diffuse, mild), 

Hypoactive Bowel Sounds.  absent: Firm, Guarding, Rigid, Hernia





- Extremities Exam


Extremities Exam: Normal Inspection





- Neurological Exam


Neurological Exam: Alert, Awake, CN II-XII Intact, Oriented x3





- Psychiatric Exam


Psychiatric exam: Normal Affect, Normal Mood





- Skin


Skin Exam: Dry, Intact, Normal Color, Warm





Assessment and Plan





- Assessment and Plan (Free Text)


Assessment: 





22M w/ab pain likely 2/2 pSBO


Plan: 





NPO


IVF


Pain control


Anti-emetics


Monitor for bowel function


Serial ab exams


GI following


Further recs as per attending





Will DW attending





Chiqui, PGY-1





<Hao Menendez - Last Filed: 01/01/18 11:39>





Objective





- Vital Signs/Intake and Output


Vital Signs (last 24 hours): 


 











Temp Pulse Resp BP Pulse Ox


 


 98.1 F   78   20   138/77   94 L


 


 01/01/18 08:14  01/01/18 08:14  01/01/18 08:14  01/01/18 08:14  01/01/18 08:14











- Medications


Medications: 


 Current Medications





Lactated Ringer's (Lactated Ringer's)  1,000 mls @ 100 mls/hr IV .Q10H [facility]


   Last Admin: 01/01/18 05:10 Dose:  100 mls/hr


Morphine Sulfate (Morphine)  4 mg IVP Q4 PRN


   PRN Reason: Pain, severe (8-10)


   Last Admin: 01/01/18 05:11 Dose:  4 mg


Morphine Sulfate (Morphine)  2 mg IVP Q4 PRN


   PRN Reason: Pain, moderate (4-7)


   Last Admin: 12/31/17 17:08 Dose:  2 mg


Ondansetron HCl (Zofran Inj)  4 mg IVP Q6 PRN


   PRN Reason: Nausea/Vomiting


   Last Admin: 12/30/17 17:17 Dose:  4 mg


Pantoprazole Sodium (Protonix Inj)  40 mg IVP DAILY [facility]


   Last Admin: 01/01/18 09:46 Dose:  40 mg











- Labs


Labs: 


 





 12/31/17 05:30 





 12/31/17 05:30 





 











PT  11.0 Seconds (9.8-13.1)   12/30/17  08:25    


 


INR  1.0  (0.9-1.2)   12/30/17  08:25    


 


APTT  28.0 Seconds (25.6-37.1)   12/30/17  08:25    














Assessment and Plan





- Assessment and Plan (Free Text)


Plan: 





will get KUB today

## 2018-01-01 NOTE — RAD
HISTORY:

obstruction  



COMPARISON:

CT of the abdomen and pelvis with IV contrast performed 12/30/17



FINDINGS:



BOWEL:

Prominent nonspecific air-filled loops of small bowel; appearance 

remains concerning for obstruction. Mild constipation.



BONES:

No acute osseous abnormality is detected.



OTHER FINDINGS:

None.



IMPRESSION:

Prominent nonspecific air-filled loops of small bowel; appearance 

remains concerning for obstruction.



Mild constipation.

## 2018-01-01 NOTE — RAD
HISTORY:

NGT placement  



COMPARISON:

Chest x-ray performed 12/30/17 



TECHNIQUE:

Chest, one view.



FINDINGS:

Examination limited by habitus. 



Nasogastric tube extends to the expected location of the stomach.



LUNGS:

No focal consolidation.



Please note that chest x-ray has limited sensitivity for the 

detection of pulmonary masses.



PLEURA:

No significant pleural effusion identified. No definite pneumothorax .



CARDIOVASCULAR:

The cardiomediastinal silhouette appears within normal limits of 

size. 



OSSEOUS STRUCTURES:

 No acute osseous abnormality identified.



VISUALIZED UPPER ABDOMEN:

Air-filled dilated loops of bowel noted in the left upper quadrant.



OTHER FINDINGS:

None.



IMPRESSION:

Nasogastric tube extends expected location of stomach.



Re-identified air-filled loops of bowel within the left upper 

quadrant.

## 2018-01-01 NOTE — CP.PCM.PN
Subjective





- Date & Time of Evaluation


Date of Evaluation: 01/01/18


Time of Evaluation: 14:00





- Subjective


Subjective: 





No fever


vomited once today


NGT placement attempted however pt unable to tolerate procedure so didnt want 

it placed


still with abd pain


no flatus nor BM today


no CP


no SOB








Objective





- Vital Signs/Intake and Output


Vital Signs (last 24 hours): 


 











Temp Pulse Resp BP Pulse Ox


 


 98.1 F   78   20   138/77   94 L


 


 01/01/18 08:14  01/01/18 08:14  01/01/18 08:14  01/01/18 08:14  01/01/18 08:14











- Medications


Medications: 


 Current Medications





Lactated Ringer's (Lactated Ringer's)  1,000 mls @ 100 mls/hr IV .Q10H [facility]


   Last Admin: 01/01/18 05:10 Dose:  100 mls/hr


Morphine Sulfate (Morphine)  4 mg IVP Q4 PRN


   PRN Reason: Pain, severe (8-10)


   Last Admin: 01/01/18 12:24 Dose:  4 mg


Morphine Sulfate (Morphine)  2 mg IVP Q4 PRN


   PRN Reason: Pain, moderate (4-7)


   Last Admin: 01/01/18 14:23 Dose:  2 mg


Ondansetron HCl (Zofran Inj)  4 mg IVP Q6 PRN


   PRN Reason: Nausea/Vomiting


   Last Admin: 01/01/18 12:24 Dose:  4 mg


Pantoprazole Sodium (Protonix Inj)  40 mg IVP DAILY [facility]


   Last Admin: 01/01/18 09:46 Dose:  40 mg











- Labs


Labs: 


 





 12/31/17 05:30 





 12/31/17 05:30 





 











PT  11.0 Seconds (9.8-13.1)   12/30/17  08:25    


 


INR  1.0  (0.9-1.2)   12/30/17  08:25    


 


APTT  28.0 Seconds (25.6-37.1)   12/30/17  08:25    

















- Constitutional


Appears: No Acute Distress





- Head Exam


Head Exam: NORMAL INSPECTION, NORMOCEPHALIC





- Eye Exam


Eye Exam: EOMI, Normal appearance, PERRL


Pupil Exam: NORMAL ACCOMODATION





- ENT Exam


ENT Exam: Mucous Membranes Moist, Normal External Ear Exam





- Neck Exam


Neck Exam: Full ROM.  absent: Meningismus





- Respiratory Exam


Respiratory Exam: NORMAL BREATHING PATTERN.  absent: Rales, Wheezes, 

Respiratory Distress





- Cardiovascular Exam


Cardiovascular Exam: REGULAR RHYTHM, +S1, +S2





- GI/Abdominal Exam


GI & Abdominal Exam: Soft, Tenderness, Hypoactive Bowel Sounds





- Extremities Exam


Extremities Exam: Full ROM, Normal Capillary Refill.  absent: Calf Tenderness





- Back Exam


Back Exam: absent: CVA tenderness (L), CVA tenderness (R)





- Neurological Exam


Neurological Exam: Alert, Awake, CN II-XII Intact, Normal Gait, Oriented x3


Neuro motor strength exam: Left Upper Extremity: 5, Right Upper Extremity: 5, 

Left Lower Extremity: 5, Right Lower Extremity: 5





- Psychiatric Exam


Psychiatric exam: Normal Affect, Normal Mood





- Skin


Skin Exam: Dry, Normal Color, Warm





Assessment and Plan


(1) SBO (small bowel obstruction)


Status: Acute   





- Assessment and Plan (Free Text)


Assessment: 





21 y/o gent, no significant PMH, except for 1 previous of similar episode ( SBO

) years ago,   came in bec of abd pain accompanied by nausea and vomiting.  

Denies fever, no hx of abd surgery, no previous  hx of  abd pains, diarrhea nor 

constipation.


CT of abd: Mechanical small bowel obstruction





1. SBO


- unclear etiology ? Crohns


- NPO


-NGT placement attempted  however pt is  unable to tolerate NGT  


-IVF hydration


- GI consult


- Surgery consult


- Pain mgt





2. Leukocytosis prob reactive


- pt afebrile


- will cont to monitor

## 2018-01-02 LAB
ALBUMIN SERPL-MCNC: 4.3 G/DL (ref 3.5–5)
ALBUMIN/GLOB SERPL: 1.3 {RATIO} (ref 1–2.1)
ALT SERPL-CCNC: 31 U/L (ref 21–72)
AST SERPL-CCNC: 13 U/L (ref 17–59)
BUN SERPL-MCNC: 12 MG/DL (ref 9–20)
CALCIUM SERPL-MCNC: 9.7 MG/DL (ref 8.4–10.2)
ERYTHROCYTE [DISTWIDTH] IN BLOOD BY AUTOMATED COUNT: 12.4 % (ref 11.5–14.5)
GFR NON-AFRICAN AMERICAN: > 60
HGB BLD-MCNC: 16.5 G/DL (ref 12–18)
MCH RBC QN AUTO: 30.9 PG (ref 27–31)
MCHC RBC AUTO-ENTMCNC: 35.8 G/DL (ref 33–37)
MCV RBC AUTO: 86.4 FL (ref 80–94)
PLATELET # BLD: 264 K/UL (ref 130–400)
RBC # BLD AUTO: 5.27 MIL/UL (ref 4.4–5.9)
WBC # BLD AUTO: 15.3 K/UL (ref 4.8–10.8)

## 2018-01-02 NOTE — CP.PCM.PN
<Galo Gamez - Last Filed: 01/02/18 07:21>





Subjective





- Date & Time of Evaluation


Date of Evaluation: 01/02/18


Time of Evaluation: 07:16





- Subjective


Subjective: 





General Surgery:  Dr Menendez 





Pt S&E.  Had NGT placed yesterday w/ 200cc output.  Around 430AM pt removed NGT 

and would not allow it to be replaced.  Still having abdominal pain mainly 

lower quadrants.  Also still complaining of nausea.  Has not had any emesis 

since NGT fell out.  Has been OOB and ambulating.  Denies passing flatus.








Objective





- Vital Signs/Intake and Output


Vital Signs (last 24 hours): 


 











Temp Pulse Resp BP Pulse Ox


 


 98.6 F   98 H  19   133/82   99 


 


 01/02/18 00:12  01/02/18 00:12  01/02/18 00:12  01/02/18 00:12  01/02/18 00:12








Intake and Output: 


 











 01/02/18 01/02/18





 06:59 18:59


 


Output Total 50 


 


Balance -50 














- Medications


Medications: 


 Current Medications





Lactated Ringer's (Lactated Ringer's)  1,000 mls @ 100 mls/hr IV .Q10H [facility]


   Last Admin: 01/02/18 05:00 Dose:  100 mls/hr


Morphine Sulfate (Morphine)  4 mg IVP Q4 PRN


   PRN Reason: Pain, severe (8-10)


   Last Admin: 01/01/18 12:24 Dose:  4 mg


Morphine Sulfate (Morphine)  2 mg IVP Q4 PRN


   PRN Reason: Pain, moderate (4-7)


   Last Admin: 01/02/18 06:31 Dose:  2 mg


Ondansetron HCl (Zofran Inj)  4 mg IVP Q6 PRN


   PRN Reason: Nausea/Vomiting


   Last Admin: 01/01/18 18:43 Dose:  4 mg


Pantoprazole Sodium (Protonix Inj)  40 mg IVP DAILY [facility]


   Last Admin: 01/01/18 09:46 Dose:  40 mg











- Labs


Labs: 


 





 01/02/18 05:55 





 01/02/18 05:55 





 











PT  11.0 Seconds (9.8-13.1)   12/30/17  08:25    


 


INR  1.0  (0.9-1.2)   12/30/17  08:25    


 


APTT  28.0 Seconds (25.6-37.1)   12/30/17  08:25    














- Constitutional


Appears: Non-toxic, No Acute Distress





- Head Exam


Head Exam: NORMAL INSPECTION





- ENT Exam


ENT Exam: Mucous Membranes Dry





- Respiratory Exam


Respiratory Exam: absent: Accessory Muscle Use, Respiratory Distress





- Cardiovascular Exam


Cardiovascular Exam: REGULAR RHYTHM.  absent: Tachycardia





- GI/Abdominal Exam


GI & Abdominal Exam: Distended, Soft, Tenderness (b/l lower quadrants ).  absent

: Firm, Guarding, Rigid, Hernia, Mass





- Neurological Exam


Neurological Exam: Alert, Awake, Oriented x3





- Psychiatric Exam


Psychiatric exam: Normal Affect, Normal Mood





Assessment and Plan





- Assessment and Plan (Free Text)


Assessment: 





22M w/ partial SBO


Plan: 





cont to monitor


pt refusing NGT - agrees to replace if vomits again


will consider enema once d/w attending as KUB shows significant constipation





will d/w Dr Shon Gamez, PGY3





<David Edward - Last Filed: 01/02/18 16:43>





Subjective





- Date & Time of Evaluation


Time of Evaluation: 16:05





- Subjective


Subjective: 





Patient was seen and examined at the bedside. Agree with resident's note above.





Objective





- Vital Signs/Intake and Output


Vital Signs (last 24 hours): 


 











Temp Pulse Resp BP Pulse Ox


 


 97.9 F   76   19   143/85   98 


 


 01/02/18 15:43  01/02/18 15:43  01/02/18 15:43  01/02/18 15:43  01/02/18 15:43








Intake and Output: 


 











 01/02/18 01/02/18





 06:59 18:59


 


Output Total 50 


 


Balance -50 














- Medications


Medications: 


 Current Medications





Barium Sulfate (Volumen 450 Ml)  450 ml PO Q20MIN [facility]


   Stop: 01/05/18 12:16


Lactated Ringer's (Lactated Ringer's)  1,000 mls @ 125 mls/hr IV .Q8H [facility]


Morphine Sulfate (Morphine)  2 mg IVP Q4 PRN


   PRN Reason: Pain, moderate (4-7)


   Last Admin: 01/02/18 06:31 Dose:  2 mg


Ondansetron HCl (Zofran Inj)  4 mg IVP Q6 PRN


   PRN Reason: Nausea/Vomiting


   Last Admin: 01/01/18 18:43 Dose:  4 mg


Pantoprazole Sodium (Protonix Inj)  40 mg IVP DAILY [facility]


   Last Admin: 01/02/18 08:56 Dose:  40 mg











- Labs


Labs: 


 





 01/02/18 05:55 





 01/02/18 05:55 





 











PT  11.0 Seconds (9.8-13.1)   12/30/17  08:25    


 


INR  1.0  (0.9-1.2)   12/30/17  08:25    


 


APTT  28.0 Seconds (25.6-37.1)   12/30/17  08:25    














Assessment and Plan





- Assessment and Plan (Free Text)


Plan: 





- Keep NPO


- IV fluids


- pain control


- Zofran prn


- If vomits will needs NG tube replaced


- If no change in clinical status will consider Diagnostic laparoscopy in am


- Repeat labs in am


- Will follow

## 2018-01-02 NOTE — CP.PCM.PN
Subjective





- Date & Time of Evaluation


Date of Evaluation: 01/02/18


Time of Evaluation: 10:21





- Subjective


Subjective: 





Still with significant abdominal pain and received morphine this morning.





Objective





- Vital Signs/Intake and Output


Vital Signs (last 24 hours): 


 











Temp Pulse Resp BP Pulse Ox


 


 98.1 F   79   19   138/79   99 


 


 01/02/18 07:42  01/02/18 07:42  01/02/18 07:42  01/02/18 07:42  01/02/18 07:42








Intake and Output: 


 











 01/02/18 01/02/18





 06:59 18:59


 


Output Total 50 


 


Balance -50 














- Medications


Medications: 


 Current Medications





Lactated Ringer's (Lactated Ringer's)  1,000 mls @ 100 mls/hr IV .Q10H [facility]


   Last Admin: 01/02/18 05:00 Dose:  100 mls/hr


Morphine Sulfate (Morphine)  4 mg IVP Q4 PRN


   PRN Reason: Pain, severe (8-10)


   Last Admin: 01/01/18 12:24 Dose:  4 mg


Morphine Sulfate (Morphine)  2 mg IVP Q4 PRN


   PRN Reason: Pain, moderate (4-7)


   Last Admin: 01/02/18 06:31 Dose:  2 mg


Ondansetron HCl (Zofran Inj)  4 mg IVP Q6 PRN


   PRN Reason: Nausea/Vomiting


   Last Admin: 01/01/18 18:43 Dose:  4 mg


Pantoprazole Sodium (Protonix Inj)  40 mg IVP DAILY [facility]


   Last Admin: 01/02/18 08:56 Dose:  40 mg











- Labs


Labs: 


 





 01/02/18 05:55 





 01/02/18 05:55 





 











PT  11.0 Seconds (9.8-13.1)   12/30/17  08:25    


 


INR  1.0  (0.9-1.2)   12/30/17  08:25    


 


APTT  28.0 Seconds (25.6-37.1)   12/30/17  08:25    














- Constitutional


Appears: No Acute Distress





- Head Exam


Head Exam: ATRAUMATIC





- Eye Exam


Eye Exam: Normal appearance





- Neck Exam


Neck Exam: Normal Inspection





- Respiratory Exam


Respiratory Exam: Clear to Ausculation Bilateral





- Cardiovascular Exam


Cardiovascular Exam: REGULAR RHYTHM, +S1, +S2





- GI/Abdominal Exam


GI & Abdominal Exam: Soft, Tenderness





Assessment and Plan


(1) Abdominal pain


Assessment & Plan: 


till with significant symtomatology. KUB shows dilation of proximal small bowel 

with relative paucity of intestinal air distal small bowel. Discussed with Dr. Barrow and further imaging to evaluate small bowel should be considered and 

discussed with radiology. Options include CT or MR enterography.


Status: Acute

## 2018-01-02 NOTE — CP.PCM.PN
Subjective





- Date & Time of Evaluation


Date of Evaluation: 01/02/18


Time of Evaluation: 11:15





- Subjective


Subjective: 





No fever


still with abd pain 


NGT accidentally pulled out - pt refused reinsertion


no vomiting this am


no flatus


no BM


no CP


no SOB





Objective





- Vital Signs/Intake and Output


Vital Signs (last 24 hours): 


 











Temp Pulse Resp BP Pulse Ox


 


 98.1 F   79   19   138/79   99 


 


 01/02/18 07:42  01/02/18 07:42  01/02/18 07:42  01/02/18 07:42  01/02/18 07:42








Intake and Output: 


 











 01/02/18 01/02/18





 06:59 18:59


 


Output Total 50 


 


Balance -50 














- Medications


Medications: 


 Current Medications





Lactated Ringer's (Lactated Ringer's)  1,000 mls @ 100 mls/hr IV .Q10H [facility]


   Last Admin: 01/02/18 05:00 Dose:  100 mls/hr


Morphine Sulfate (Morphine)  2 mg IVP Q4 PRN


   PRN Reason: Pain, moderate (4-7)


   Last Admin: 01/02/18 06:31 Dose:  2 mg


Ondansetron HCl (Zofran Inj)  4 mg IVP Q6 PRN


   PRN Reason: Nausea/Vomiting


   Last Admin: 01/01/18 18:43 Dose:  4 mg


Pantoprazole Sodium (Protonix Inj)  40 mg IVP DAILY [facility]


   Last Admin: 01/02/18 08:56 Dose:  40 mg











- Labs


Labs: 


 





 01/02/18 05:55 





 01/02/18 05:55 





 











PT  11.0 Seconds (9.8-13.1)   12/30/17  08:25    


 


INR  1.0  (0.9-1.2)   12/30/17  08:25    


 


APTT  28.0 Seconds (25.6-37.1)   12/30/17  08:25    














- Constitutional


Appears: No Acute Distress





- Head Exam


Head Exam: NORMAL INSPECTION, NORMOCEPHALIC





- Eye Exam


Eye Exam: EOMI, Normal appearance, PERRL


Pupil Exam: NORMAL ACCOMODATION





- ENT Exam


ENT Exam: Mucous Membranes Moist, Normal External Ear Exam





- Neck Exam


Neck Exam: Full ROM.  absent: Meningismus





- Respiratory Exam


Respiratory Exam: NORMAL BREATHING PATTERN.  absent: Rales, Wheezes, 

Respiratory Distress





- Cardiovascular Exam


Cardiovascular Exam: REGULAR RHYTHM, +S1, +S2





- GI/Abdominal Exam


GI & Abdominal Exam: Soft, Tenderness, Hypoactive Bowel Sounds





- Extremities Exam


Extremities Exam: Full ROM, Normal Capillary Refill.  absent: Calf Tenderness





- Back Exam


Back Exam: absent: CVA tenderness (L), CVA tenderness (R)





- Neurological Exam


Neurological Exam: Alert, Awake, CN II-XII Intact, Normal Gait, Oriented x3


Neuro motor strength exam: Left Upper Extremity: 5, Right Upper Extremity: 5, 

Left Lower Extremity: 5, Right Lower Extremity: 5





- Psychiatric Exam


Psychiatric exam: Normal Affect, Normal Mood





- Skin


Skin Exam: Dry, Normal Color, Warm





Assessment and Plan


(1) SBO (small bowel obstruction)


Status: Acute   





- Assessment and Plan (Free Text)


Assessment: 





23 y/o gent, no significant PMH, except for 1 previous of similar episode ( SBO

) years ago,   came in bec of abd pain accompanied by nausea and vomiting.  

Denies fever, no hx of abd surgery, no previous  hx of  abd pains, diarrhea nor 

constipation.


CT of abd: Mechanical small bowel obstruction





1. SBO


- unclear etiology ? Crohns


- NPO


-NGT placed however  NGT accidentally pulled out and pt refused reinsertion


-IVF hydration


- GI consult- discussed case rec CT Enterography


- Surgery consult- possible Laparoscopy in am if SBO does not resolved


- Pain mgt





2. Leukocytosis prob reactive


- pt afebrile


- will cont to monitor

## 2018-01-03 LAB
ALBUMIN SERPL-MCNC: 4 G/DL (ref 3.5–5)
ALBUMIN/GLOB SERPL: 1.4 {RATIO} (ref 1–2.1)
ALT SERPL-CCNC: 27 U/L (ref 21–72)
AST SERPL-CCNC: 12 U/L (ref 17–59)
BASOPHILS # BLD AUTO: 0.1 K/UL (ref 0–0.2)
BASOPHILS NFR BLD: 0.9 % (ref 0–2)
BUN SERPL-MCNC: 12 MG/DL (ref 9–20)
CALCIUM SERPL-MCNC: 9.2 MG/DL (ref 8.4–10.2)
EOSINOPHIL # BLD AUTO: 0 K/UL (ref 0–0.7)
EOSINOPHIL NFR BLD: 0.2 % (ref 0–4)
ERYTHROCYTE [DISTWIDTH] IN BLOOD BY AUTOMATED COUNT: 12.4 % (ref 11.5–14.5)
GFR NON-AFRICAN AMERICAN: > 60
HGB BLD-MCNC: 15.1 G/DL (ref 12–18)
LYMPHOCYTES # BLD AUTO: 1.3 K/UL (ref 1–4.3)
LYMPHOCYTES NFR BLD AUTO: 12 % (ref 20–40)
MCH RBC QN AUTO: 30.8 PG (ref 27–31)
MCHC RBC AUTO-ENTMCNC: 35.2 G/DL (ref 33–37)
MCV RBC AUTO: 87.5 FL (ref 80–94)
MONOCYTES # BLD: 1 K/UL (ref 0–0.8)
MONOCYTES NFR BLD: 9.2 % (ref 0–10)
NEUTROPHILS # BLD: 8.7 K/UL (ref 1.8–7)
NEUTROPHILS NFR BLD AUTO: 77.7 % (ref 50–75)
NRBC BLD AUTO-RTO: 0.2 % (ref 0–0)
PLATELET # BLD: 230 K/UL (ref 130–400)
PMV BLD AUTO: 8.3 FL (ref 7.2–11.7)
RBC # BLD AUTO: 4.89 MIL/UL (ref 4.4–5.9)
WBC # BLD AUTO: 11.1 K/UL (ref 4.8–10.8)

## 2018-01-03 PROCEDURE — 0DBB4ZZ EXCISION OF ILEUM, PERCUTANEOUS ENDOSCOPIC APPROACH: ICD-10-PCS | Performed by: SURGERY

## 2018-01-03 RX ADMIN — HYDROMORPHONE HYDROCHLORIDE PRN MG: 1 INJECTION, SOLUTION INTRAMUSCULAR; INTRAVENOUS; SUBCUTANEOUS at 14:25

## 2018-01-03 RX ADMIN — HYDROMORPHONE HYDROCHLORIDE PRN MG: 1 INJECTION, SOLUTION INTRAMUSCULAR; INTRAVENOUS; SUBCUTANEOUS at 14:10

## 2018-01-03 RX ADMIN — HYDROMORPHONE HYDROCHLORIDE PRN MG: 1 INJECTION, SOLUTION INTRAMUSCULAR; INTRAVENOUS; SUBCUTANEOUS at 14:40

## 2018-01-03 NOTE — OP
PROCEDURE DATE:  01/03/2018



OPERATION PERFORMED:  Diagnostic laparoscopy, laparoscopic resection of

Meckel's diverticulum and reduction of internal volvulus of small

intestine.



SURGEON:  Hao Menendez MD.



ASSISTANT:  David Edward MD.



SECOND ASSISTANT:  Dr. Gamez.



TYPE OF ANESTHESIA:  General anesthesia.



OPERATIVE FINDINGS:  Meckel's diverticulum with distal adhesions to the tip

of the Meckel's, volvulus of the small bowel with 2 twists.



ESTIMATED BLOOD LOSS:  Minimal.



DESCRIPTION OF PROCEDURE:  The operative proceedings are as follows.  The

patient was taken to the operating room and placed supine on the operating

table.  After induction of general anesthesia, Chavez catheter was placed

and the abdomen was prepped and draped in a standard surgical fashion. 

Veress needle was used to insufflate the abdomen via the umbilicus.  Once

that was done, a left-sided 5 mm trocar was placed under direct vision.  A

second 5 mm left-sided trocar was placed.  The patient was found to have a

suspicious-looking area, which appeared to have a segment of bowel pressing

over a second segment of bowel reminiscent of an internal hernia.  The

patient also had collapsed loops distal to the segment of bowel.  A third 5

mm trocar was then placed into the abdomen and the bowel was inspected and

run and the patient was found to have a window with bowel within the

window.  At that point, it was noted that there appeared to be 2 twists of

the small bowel, so using the laparoscopic instruments, the bowel was

pulled through to in order to release the twists and then pulled through a

second time.  Once the twists were removed from the bowel, the bowel was

examined and held up and it was noted that the twists all happened about a

Meckel's diverticulum.  There was no evidence of inflammation of the

diverticulum and the neck of the diverticulum was well defined.  A CONRAD

stapler was then introduced via an up sized 12 mm lateral trocar and this

was fired across the base of the Meckel's.  Case was taken not to encroach

upon the small bowel.  Once that was done, the small bowel was evaluated

and was found to be in good condition.  A second firing of the CONRAD was used

in order to transect the fatty adhesions to the Meckel's and the mesentery

of the Meckel's.  Once that was done, the Meckel's was 

completely.  The area was inspected.  There was no evidence of bleeding. 

The Meckel's was placed into an EndoCatch bag.  The Meckel's was then

removed via the lateral 12 mm trocar site.  The fascia was then closed

using 0 Vicryl.  The skin incisions were closed using 4-0 Monocryl.  The

patient was then awaken from Anesthesia, transported to recovery in

satisfactory condition.  Sponge, instrument and needle counts were correct

at the end of the case.



__________________________________________

Hao Menendez MD

 



DD:  01/03/2018 13:47:13

DT:  01/03/2018 14:44:15

Job # [id number]

## 2018-01-03 NOTE — CP.PCM.PN
Subjective





- Date & Time of Evaluation


Date of Evaluation: 01/03/18


Time of Evaluation: 10:21





- Subjective


Subjective: 





Patient with ongoing abdominal pain and distention





Objective





- Vital Signs/Intake and Output


Vital Signs (last 24 hours): 


 











Temp Pulse Resp BP Pulse Ox


 


 98.2 F   65   20   139/81   99 


 


 01/03/18 08:31  01/03/18 08:31  01/03/18 08:31  01/03/18 08:31  01/03/18 08:31











- Medications


Medications: 


 Current Medications





Barium Sulfate (Volumen 450 Ml)  450 ml PO Q20MIN [facility]


   Stop: 01/05/18 12:16


Lactated Ringer's (Lactated Ringer's)  1,000 mls @ 125 mls/hr IV .Q8H [facility]


   Last Admin: 01/03/18 09:06 Dose:  Not Given


Morphine Sulfate (Morphine)  2 mg IVP Q4 PRN


   PRN Reason: Pain, moderate (4-7)


   Last Admin: 01/03/18 02:34 Dose:  2 mg


Ondansetron HCl (Zofran Inj)  4 mg IVP Q6 PRN


   PRN Reason: Nausea/Vomiting


   Last Admin: 01/01/18 18:43 Dose:  4 mg


Pantoprazole Sodium (Protonix Inj)  40 mg IVP DAILY [facility]


   Last Admin: 01/03/18 09:06 Dose:  40 mg











- Labs


Labs: 


 





 01/03/18 05:45 





 01/03/18 05:45 





 











PT  11.0 Seconds (9.8-13.1)   12/30/17  08:25    


 


INR  1.0  (0.9-1.2)   12/30/17  08:25    


 


APTT  28.0 Seconds (25.6-37.1)   12/30/17  08:25    














- Head Exam


Head Exam: ATRAUMATIC





- Eye Exam


Eye Exam: Normal appearance





- ENT Exam


ENT Exam: Normal Exam





- Neck Exam


Neck Exam: Full ROM





- Respiratory Exam


Respiratory Exam: Clear to Ausculation Bilateral





- Cardiovascular Exam


Cardiovascular Exam: REGULAR RHYTHM, +S1, +S2





- GI/Abdominal Exam


GI & Abdominal Exam: Firm, Tenderness, Normal Bowel Sounds





Assessment and Plan


(1) Abdominal pain


Assessment & Plan: 


Abdominal pain and bloating persists. For laparoscopy today to determine cause 

of partial bowel obstruction


Status: Acute

## 2018-01-03 NOTE — CP.PCM.PN
Subjective





- Date & Time of Evaluation


Date of Evaluation: 01/03/18


Time of Evaluation: 09:45





- Subjective


Subjective: 


Patient seen and examined bedside.Feeling better, pain is controlled .Not 

passing any flatus , burping . Denies any chest pain or SOB.


Hemodynamically stable, afebrile


No acute issues overnight.


For diagnostic laparascopy today





Objective





- Vital Signs/Intake and Output


Vital Signs (last 24 hours): 


 











Temp Pulse Resp BP Pulse Ox


 


 98.7 F   83   19   136/81   99 


 


 01/03/18 00:01  01/03/18 00:01  01/03/18 00:01  01/03/18 00:01  01/03/18 00:01











- Medications


Medications: 


 Current Medications





Barium Sulfate (Volumen 450 Ml)  450 ml PO Q20MIN [facility]


   Stop: 01/05/18 12:16


Lactated Ringer's (Lactated Ringer's)  1,000 mls @ 125 mls/hr IV .Q8H [facility]


   Last Admin: 01/03/18 02:34 Dose:  125 mls/hr


Morphine Sulfate (Morphine)  2 mg IVP Q4 PRN


   PRN Reason: Pain, moderate (4-7)


   Last Admin: 01/03/18 02:34 Dose:  2 mg


Ondansetron HCl (Zofran Inj)  4 mg IVP Q6 PRN


   PRN Reason: Nausea/Vomiting


   Last Admin: 01/01/18 18:43 Dose:  4 mg


Pantoprazole Sodium (Protonix Inj)  40 mg IVP DAILY [facility]


   Last Admin: 01/02/18 08:56 Dose:  40 mg











- Labs


Labs: 


 





 01/03/18 05:45 





 01/03/18 05:45 





 











PT  11.0 Seconds (9.8-13.1)   12/30/17  08:25    


 


INR  1.0  (0.9-1.2)   12/30/17  08:25    


 


APTT  28.0 Seconds (25.6-37.1)   12/30/17  08:25    














- Constitutional


Appears: Non-toxic, No Acute Distress





- Head Exam


Head Exam: ATRAUMATIC, NORMAL INSPECTION, NORMOCEPHALIC





- Eye Exam


Eye Exam: EOMI, Normal appearance, PERRL


Pupil Exam: NORMAL ACCOMODATION





- ENT Exam


ENT Exam: Mucous Membranes Moist, Normal Exam





- Neck Exam


Neck Exam: Full ROM, Normal Inspection





- Respiratory Exam


Respiratory Exam: Clear to Ausculation Bilateral, NORMAL BREATHING PATTERN.  

absent: Rales, Rhonchi, Wheezes





- Cardiovascular Exam


Cardiovascular Exam: REGULAR RHYTHM, RRR, +S1, +S2.  absent: JVD





- GI/Abdominal Exam


GI & Abdominal Exam: Soft, Normal Bowel Sounds.  absent: Distended, Guarding, 

Tenderness, Rebound





- Rectal Exam


Rectal Exam: Deferred





- Extremities Exam


Extremities Exam: Full ROM, Normal Capillary Refill, Normal Inspection.  absent

: Pedal Edema





- Back Exam


Back Exam: NORMAL INSPECTION





- Neurological Exam


Neurological Exam: Alert, Awake, CN II-XII Intact, Oriented x3





- Psychiatric Exam


Psychiatric exam: Normal Affect, Normal Mood





- Skin


Skin Exam: Dry, Intact, Normal Color, Warm





Assessment and Plan





- Assessment and Plan (Free Text)


Assessment: 


21 y/o male with no significant PMH, except for 1 episode of  SBO years ago, 

came in because  of abdominal , pain accompanied by nausea and vomiting. 


CT of abdomen showed  Mechanical small bowel obstruction. Patient was admitted 

in med/surg, kept NPO , started IVF, pain medication , NGT placed . Surgery and 

GI consulted


He is hemodynamically stable but unable to pas any flatus. NGT is out and he 

refused to reinsertion.For diagnostic  laparascopy today. 





1. SBO


unclear etiology


NGT placed however  NGT accidentally pulled out and pt refused reinsertion


continue IVF hydration, pain mangement and keep NPO 


Surgery and GI consult appreciated 


For laparascopy today 





2. Leukocytosis prob reactive


pt afebrile


will cont to monitor








3. DVt porophylaxis


SCD

## 2018-01-03 NOTE — PCM.SURG1
Surgeon's Initial Post Op Note





- Surgeon's Notes


Surgeon: Hao Menendez MD; David Edward MD


Assistant: Galo Gamez PGY-3; Tanya Florian PGY-1


Type of Anesthesia: General Endo


Pre-Operative Diagnosis: Small bowel obstruction


Operative Findings: See op report


Post-Operative Diagnosis: Volvulus due to Meckel's diverticulum


Operation Performed: Diagnostic laparoscopy with resection of Meckel's 

Diverticulum


Specimen/Specimens Removed: Meckel's diverticulum


Estimated Blood Loss: EBL {In ML}: 5


Drains Used: No Drains


Post-Op Condition: Good


Date of Surgery/Procedure: 01/03/18


Time of Surgery/Procedure: 13:18

## 2018-01-03 NOTE — PCM.RRT
RRT Nurse Assessment





- Situation


RRT Reason for Call: Hypotension


RRT Called By: RN





- IV


IV Inserted during RRT?: No





- Respiratory


Oxygen Delivery Method: Room Air


CPR started during RRT?: No





- Recommendations


RRT Level of Care Recommendations: Remain in current setting





I.Reason for RRT





- A) Acute Change in Patient:


(Select all that apply): Staff member or family is worried about patient


Subjective: 





23 y/o man s/p diagnostic laparoscopy had RRT called 01/03/2018 @ 18:19 for 

near syncopal episode.  Patient was attempting to urinate but was unable to do 

so supine and tried to get up on his own.  Upon sitting up, his fiance became 

concerned that he looked pale and dizzy and sat back down.  Patient recalls 

event and denies head trauma or LOC.  Patient denies chest pain or SOB.





- Neurological Status


(Select all that apply): Alert, Responsive, Oriented, Verbal, Follows Commands





- Respiratory


Oxygen Delivery Method: Room Air





- Constitutional


Appears: No Acute Distress





- Head


Head Exam: ATRAUMATIC, NORMAL INSPECTION, NORMOCEPHALIC





- Eyes


Eye Exam: Normal appearance





- Respiratory Exam


Respiratory Exam: Clear to Ausculation Bilateral, NORMAL BREATHING PATTERN





- Cardiovascular Exam


Cardiovascular Exam: Tachycardia, REGULAR RHYTHM





- Neurological Exam


Neurological Exam: Alert, Awake, Oriented x3





- Extremities Exam


Extremities Exam: Normal Inspection.  absent: Calf Tenderness





Plan





- Assessment of Findings&Treatment Plan





23 y/o man s/p diagnostic laparoscopy had RRT called 01/03/2018 @ 18:19 for 

near syncopal episode.  Most likely due to orthostatic hypotension.  Patient's 

BP currently WNL and tachycardic in 102-106s.  Patient is able to remain in 

current setting and have assistance with voiding or getting up when necessary.  

Patient is currently on LR @ 125mL/Hr.

## 2018-01-04 LAB
BUN SERPL-MCNC: 12 MG/DL (ref 9–20)
CALCIUM SERPL-MCNC: 8.3 MG/DL (ref 8.4–10.2)
ERYTHROCYTE [DISTWIDTH] IN BLOOD BY AUTOMATED COUNT: 12.6 % (ref 11.5–14.5)
ERYTHROCYTE [DISTWIDTH] IN BLOOD BY AUTOMATED COUNT: 12.6 % (ref 11.5–14.5)
GFR NON-AFRICAN AMERICAN: > 60
HGB BLD-MCNC: 11 G/DL (ref 12–18)
HGB BLD-MCNC: 11.4 G/DL (ref 12–18)
MCH RBC QN AUTO: 30.9 PG (ref 27–31)
MCH RBC QN AUTO: 31 PG (ref 27–31)
MCHC RBC AUTO-ENTMCNC: 35.5 G/DL (ref 33–37)
MCHC RBC AUTO-ENTMCNC: 35.5 G/DL (ref 33–37)
MCV RBC AUTO: 87.1 FL (ref 80–94)
MCV RBC AUTO: 87.3 FL (ref 80–94)
PLATELET # BLD: 232 K/UL (ref 130–400)
PLATELET # BLD: 238 K/UL (ref 130–400)
RBC # BLD AUTO: 3.54 MIL/UL (ref 4.4–5.9)
RBC # BLD AUTO: 3.69 MIL/UL (ref 4.4–5.9)
WBC # BLD AUTO: 12.7 K/UL (ref 4.8–10.8)
WBC # BLD AUTO: 13.3 K/UL (ref 4.8–10.8)

## 2018-01-04 RX ADMIN — OXYCODONE HYDROCHLORIDE AND ACETAMINOPHEN PRN TAB: 5; 325 TABLET ORAL at 15:05

## 2018-01-04 RX ADMIN — OXYCODONE HYDROCHLORIDE AND ACETAMINOPHEN PRN TAB: 5; 325 TABLET ORAL at 19:48

## 2018-01-04 NOTE — CP.PCM.PN
Subjective





- Date & Time of Evaluation


Date of Evaluation: 01/04/18


Time of Evaluation: 10:30





- Subjective


Subjective: 


Patient seen and examined bedside. Feeling sleepy and weak. Passing flatus and 

voiding freely. Able to tolerate liquid diet and jello today. With 1 episode of 

dizziness upon standing yesterday post op most likely vasovagal . Slight 

bleeding overnight from LLQ surgical incision


Hemodynamically stable ,afebrile ,saturating % in RA  BP  118/79








Objective





- Vital Signs/Intake and Output


Vital Signs (last 24 hours): 


 











Temp Pulse Resp BP Pulse Ox


 


 99.1 F   100 H  20   118/79   100 


 


 01/04/18 07:59  01/04/18 09:14  01/04/18 07:59  01/04/18 09:14  01/04/18 07:59











- Medications


Medications: 


 Current Medications





Lactated Ringer's (Lactated Ringer's)  1,000 mls @ 125 mls/hr IV .Q8H [facility]


   Last Admin: 01/04/18 08:45 Dose:  125 mls/hr


Lactated Ringer's (Lactated Ringer's)  1,000 mls @ 1,000 mls/hr IV .Q1H [facility]


   Last Admin: 01/04/18 12:09 Dose:  1,000 mls/hr


Morphine Sulfate (Morphine)  2 mg IVP Q4 PRN


   PRN Reason: Pain, Mild (1-3)


Morphine Sulfate (Morphine)  4 mg IVP Q4 PRN


   PRN Reason: Pain, severe (8-10)


Ondansetron HCl (Zofran Inj)  4 mg IVP Q6 PRN


   PRN Reason: Nausea/Vomiting


   Last Admin: 01/01/18 18:43 Dose:  4 mg


Oxycodone/Acetaminophen (Percocet 5/325 Mg Tab)  1 tab PO Q4 PRN


   PRN Reason: Pain, moderate (4-7)


   Stop: 01/06/18 13:32


   Last Admin: 01/04/18 15:05 Dose:  1 tab











- Labs


Labs: 


 





 01/04/18 11:04 





 01/04/18 05:45 





 











PT  11.0 Seconds (9.8-13.1)   12/30/17  08:25    


 


INR  1.0  (0.9-1.2)   12/30/17  08:25    


 


APTT  28.0 Seconds (25.6-37.1)   12/30/17  08:25    














- Constitutional


Appears: Non-toxic, No Acute Distress





- Head Exam


Head Exam: ATRAUMATIC, NORMAL INSPECTION, NORMOCEPHALIC





- Eye Exam


Eye Exam: EOMI, Normal appearance, PERRL


Pupil Exam: NORMAL ACCOMODATION





- ENT Exam


ENT Exam: Mucous Membranes Moist, Normal Exam





- Neck Exam


Neck Exam: Full ROM, Normal Inspection





- Respiratory Exam


Respiratory Exam: Clear to Ausculation Bilateral, NORMAL BREATHING PATTERN.  

absent: Rhonchi, Wheezes, Respiratory Distress





- Cardiovascular Exam


Cardiovascular Exam: REGULAR RHYTHM, RRR, +S1, +S2.  absent: JVD





- GI/Abdominal Exam


GI & Abdominal Exam: Soft, Normal Bowel Sounds.  absent: Distended, Guarding, 

Rebound





- Rectal Exam


Rectal Exam: Deferred





- Extremities Exam


Extremities Exam: Full ROM, Normal Capillary Refill, Normal Inspection.  absent

: Calf Tenderness, Pedal Edema





- Back Exam


Back Exam: NORMAL INSPECTION





- Neurological Exam


Neurological Exam: Alert, Awake, CN II-XII Intact, Oriented x3


Additional comments: 





weak, tired , sleepy 





- Psychiatric Exam


Psychiatric exam: Normal Affect





- Skin


Skin Exam: Dry, Normal Color, Warm





Assessment and Plan





- Assessment and Plan (Free Text)


Assessment: 


23 y/o male with no significant PMH, except for 1 episode of  SBO years ago, 

came in because  of abdominal , pain accompanied by nausea and vomiting. 


CT of abdomen showed  Mechanical small bowel obstruction. Patient was admitted 

in med/surg, kept NPO , started IVF, pain medication , NGT placed . Surgery and 

GI consulted


NGT  accidentally came out and patient refused to have it re inserted . S/p 

diagnostic laparascopy yesterday with Meckel diverticulum resection .


Today doing well, hemodynamically stable, afebrile  , felli ngh waek, tired , 

sleepy. Pain is controlled, passing flatus.  





1. SBO


Most likely secondary to Meckel's diverticulum 


s/p diagnostic laparascopy and Meckel diverticulum resection 


passing flatus today and tolerating liquid diet. Will advance diet to regular


Promote ambulation 


continue IVF and pain management


incentive spirometry  





2. Leukocytosis prob reactive


pt afebrile


will cont to monitor








3. DVt porophylaxis


SCD

## 2018-01-04 NOTE — CP.PCM.PN
Subjective





- Date & Time of Evaluation


Date of Evaluation: 01/04/18


Time of Evaluation: 08:15





- Subjective


Subjective: 





General surgery progress note for Dr. David Florian, PGY-1





Pt S & E at bedside. 





Pt reports some nausea, has tried to drink Ginger ale.  Had flatus, no BM.  Has 

not been OOB or ambulated yet.  Pain controlled. Pt w/RRT yesterday evening, 

medical team responded.  





Objective





- Vital Signs/Intake and Output


Vital Signs (last 24 hours): 


 











Temp Pulse Resp BP Pulse Ox


 


 99.1 F   100 H  20   118/79   100 


 


 01/04/18 07:59  01/04/18 09:14  01/04/18 07:59  01/04/18 09:14  01/04/18 07:59











- Medications


Medications: 


 Current Medications





Lactated Ringer's (Lactated Ringer's)  1,000 mls @ 125 mls/hr IV .Q8H [facility]


   Last Admin: 01/04/18 08:45 Dose:  125 mls/hr


Lactated Ringer's (Lactated Ringer's)  1,000 mls @ 100 mls/hr IV .Q10H [facility]


Morphine Sulfate (Morphine)  2 mg IVP Q4 PRN


   PRN Reason: Pain, moderate (4-7)


   Last Admin: 01/04/18 00:20 Dose:  2 mg


Ondansetron HCl (Zofran Inj)  4 mg IVP Q6 PRN


   PRN Reason: Nausea/Vomiting


   Last Admin: 01/01/18 18:43 Dose:  4 mg


Oxycodone/Acetaminophen (Percocet 5/325 Mg Tab)  1 tab PO Q4 PRN


   PRN Reason: Pain, moderate (4-7)


   Stop: 01/06/18 13:32


Pantoprazole Sodium (Protonix Inj)  40 mg IVP DAILY [facility]


   Last Admin: 01/04/18 08:45 Dose:  40 mg











- Labs


Labs: 


 





 01/04/18 05:45 





 01/04/18 05:45 





 











PT  11.0 Seconds (9.8-13.1)   12/30/17  08:25    


 


INR  1.0  (0.9-1.2)   12/30/17  08:25    


 


APTT  28.0 Seconds (25.6-37.1)   12/30/17  08:25    














- Constitutional


Appears: Non-toxic, No Acute Distress





- Head Exam


Head Exam: ATRAUMATIC, NORMAL INSPECTION, NORMOCEPHALIC





- Eye Exam


Eye Exam: EOMI, Normal appearance





- ENT Exam


ENT Exam: Mucous Membranes Moist, Normal Exam





- Neck Exam


Neck Exam: Full ROM, Normal Inspection





- Respiratory Exam


Respiratory Exam: NORMAL BREATHING PATTERN





- Cardiovascular Exam


Cardiovascular Exam: REGULAR RHYTHM, +S1, +S2





- GI/Abdominal Exam


GI & Abdominal Exam: Distended (mildly), Soft, Normal Bowel Sounds.  absent: 

Firm, Guarding, Rigid, Tenderness





- Neurological Exam


Neurological Exam: Alert, Awake, CN II-XII Intact, Oriented x3





- Psychiatric Exam


Psychiatric exam: Normal Affect, Normal Mood





- Skin


Skin Exam: Dry, Intact, Normal Color, Warm


Additional comments: 





surgical incision sites with dermabond in place, no erythema or drainage noted





Assessment and Plan





- Assessment and Plan (Free Text)


Assessment: 





22M POD#1 s/p diagnostic laparoscopy with resection of Meckel's diverticulum, 

with RRT yesterday evening due to dizziness, paleness. 


Plan: 





NPO for now


OOBTC


Ambulate


Encourage IS use


cont pain meds


bolus 


Hgb 11.4 from 15.1


FU H/H





Will JUSTINE attending





Chiqui, PGY-1

## 2018-01-05 VITALS — OXYGEN SATURATION: 100 %

## 2018-01-05 LAB
BUN SERPL-MCNC: 9 MG/DL (ref 9–20)
CALCIUM SERPL-MCNC: 8.3 MG/DL (ref 8.4–10.2)
ERYTHROCYTE [DISTWIDTH] IN BLOOD BY AUTOMATED COUNT: 12.3 % (ref 11.5–14.5)
GFR NON-AFRICAN AMERICAN: > 60
HGB BLD-MCNC: 9.9 G/DL (ref 12–18)
MCH RBC QN AUTO: 30.6 PG (ref 27–31)
MCHC RBC AUTO-ENTMCNC: 35.1 G/DL (ref 33–37)
MCV RBC AUTO: 87.2 FL (ref 80–94)
PLATELET # BLD: 203 K/UL (ref 130–400)
RBC # BLD AUTO: 3.22 MIL/UL (ref 4.4–5.9)
WBC # BLD AUTO: 12.3 K/UL (ref 4.8–10.8)

## 2018-01-05 RX ADMIN — OXYCODONE HYDROCHLORIDE AND ACETAMINOPHEN PRN TAB: 5; 325 TABLET ORAL at 22:32

## 2018-01-05 NOTE — CP.PCM.PN
Subjective





- Date & Time of Evaluation


Date of Evaluation: 01/05/18


Time of Evaluation: 12:30





- Subjective


Subjective: 





No fever


pain controlled


tolerated liquid diet


+ Flatus , no BM


was started on Regular diet for breakast and  pt vomited once


no CP


no SOB


ambulating around the unit





Objective





- Vital Signs/Intake and Output


Vital Signs (last 24 hours): 


 











Temp Pulse Resp BP Pulse Ox


 


 99.2 F   81   20   146/78   100 


 


 01/05/18 08:25  01/05/18 08:25  01/05/18 08:25  01/05/18 08:25  01/05/18 08:25











- Medications


Medications: 


 Current Medications





Ondansetron HCl (Zofran Inj)  4 mg IVP Q6 PRN


   PRN Reason: Nausea/Vomiting


   Last Admin: 01/05/18 11:31 Dose:  4 mg


Oxycodone/Acetaminophen (Percocet 5/325 Mg Tab)  1 tab PO Q4 PRN


   PRN Reason: Pain, moderate (4-7)


   Stop: 01/06/18 13:32


   Last Admin: 01/04/18 19:48 Dose:  1 tab











- Labs


Labs: 


 





 01/05/18 05:15 





 01/05/18 05:15 





 











PT  11.0 Seconds (9.8-13.1)   12/30/17  08:25    


 


INR  1.0  (0.9-1.2)   12/30/17  08:25    


 


APTT  28.0 Seconds (25.6-37.1)   12/30/17  08:25    











- Constitutional


Appears: No Acute Distress





- Head Exam


Head Exam: NORMAL INSPECTION, NORMOCEPHALIC





- Eye Exam


Eye Exam: EOMI, Normal appearance, PERRL


Pupil Exam: NORMAL ACCOMODATION





- ENT Exam


ENT Exam: Mucous Membranes Moist, Normal External Ear Exam





- Neck Exam


Neck Exam: Full ROM.  absent: Meningismus





- Respiratory Exam


Respiratory Exam: NORMAL BREATHING PATTERN.  absent: Rales, Wheezes, 

Respiratory Distress





- Cardiovascular Exam


Cardiovascular Exam: REGULAR RHYTHM, +S1, +S2





- GI/Abdominal Exam


GI & Abdominal Exam:  small Lap surgical scars, Soft, sl tenderness , 

normoactive Bowel Sounds





- Extremities Exam


Extremities Exam: Full ROM, Normal Capillary Refill.  absent: Calf Tenderness





- Back Exam


Back Exam: absent: CVA tenderness (L), CVA tenderness (R)





- Neurological Exam


Neurological Exam: Alert, Awake, CN II-XII Intact, Normal Gait, Oriented x3


Neuro motor strength exam: Left Upper Extremity: 5, Right Upper Extremity: 5, 

Left Lower Extremity: 5, Right Lower Extremity: 5





- Psychiatric Exam


Psychiatric exam: Normal Affect, Normal Mood





- Skin


Skin Exam: Dry, Normal Color, Warm





Assessment and Plan


(1) SBO (small bowel obstruction)


Status: Acute   





(2) Meckels diverticulum


Status: Acute   





- Assessment and Plan (Free Text)


Assessment: 





23 y/o male with no significant PMH, except for 1 episode of  SBO  almost 10 

years ago, came in because  of abdominal , pain accompanied by nausea and 

vomiting. 


CT of abdomen showed  Mechanical small bowel obstruction. Patient was admitted 

in med/surg, kept NPO , started IVF, pain medication , NGT placed . Surgery and 

GI consulted


NGT  accidentally came out and patient refused to have it re inserted . S/p 

diagnostic Laparoscopy  1/3/18 : Meckel diverticulum resection done .


Vomited this am after he was started on Regular diet. Diet  changed to Liquid .

  Pain is controlled, passing flatus.  





1. SBO


Most likely secondary to Meckel's diverticulum 


s/p diagnostic laparascopy and Meckel diverticulum resection 


passing flatus today and tolerating liquid diet, Regular diet started - pt 

vomited once - diet changed back to Liquid 


Promote ambulation 


continue IVF and pain management


incentive spirometry  





2. Leukocytosis prob reactive


pt afebrile


will cont to monitor








3. DVt porophylaxis


SCD

## 2018-01-05 NOTE — CP.PCM.PN
Subjective





- Date & Time of Evaluation


Date of Evaluation: 01/05/18


Time of Evaluation: 07:45





- Subjective


Subjective: 





General surgery progress note for Dr. Jenae Florian, PGY-1





Pt S & E at bedside with attending. 





Pt reports some nausea with chicken last night. Continues with some ab pain.  

Denies N & V, F & C. Pt w/RRT overnight due to bleeding from surgical site in 

LLQ- Surgical resident placed a suture with resolution of bleeding.  





Objective





- Vital Signs/Intake and Output


Vital Signs (last 24 hours): 


 











Temp Pulse Resp BP Pulse Ox


 


 98.9 F   87   20   148/80   100 


 


 01/05/18 06:20  01/05/18 06:20  01/05/18 06:20  01/05/18 06:20  01/05/18 06:20











- Medications


Medications: 


 Current Medications





Ondansetron HCl (Zofran Inj)  4 mg IVP Q6 PRN


   PRN Reason: Nausea/Vomiting


   Last Admin: 01/04/18 22:09 Dose:  4 mg


Oxycodone/Acetaminophen (Percocet 5/325 Mg Tab)  1 tab PO Q4 PRN


   PRN Reason: Pain, moderate (4-7)


   Stop: 01/06/18 13:32


   Last Admin: 01/04/18 19:48 Dose:  1 tab











- Labs


Labs: 


 





 01/05/18 05:15 





 01/05/18 05:15 





 











PT  11.0 Seconds (9.8-13.1)   12/30/17  08:25    


 


INR  1.0  (0.9-1.2)   12/30/17  08:25    


 


APTT  28.0 Seconds (25.6-37.1)   12/30/17  08:25    














- Constitutional


Appears: Non-toxic, No Acute Distress





- Head Exam


Head Exam: ATRAUMATIC, NORMAL INSPECTION, NORMOCEPHALIC





- Eye Exam


Eye Exam: EOMI, Normal appearance





- ENT Exam


ENT Exam: Mucous Membranes Moist, Normal Exam





- Neck Exam


Neck Exam: Full ROM, Normal Inspection





- Respiratory Exam


Respiratory Exam: NORMAL BREATHING PATTERN





- Cardiovascular Exam


Cardiovascular Exam: REGULAR RHYTHM, +S1, +S2





- GI/Abdominal Exam


GI & Abdominal Exam: Soft, Tenderness (over incision sites).  absent: Distended

, Firm, Guarding, Rigid





-  Exam


 Exam: Scrotal Swelling (with scrotal tenderness of right)





- Extremities Exam


Extremities Exam: Normal Inspection





- Neurological Exam


Neurological Exam: Alert, Awake, CN II-XII Intact, Oriented x3





- Psychiatric Exam


Psychiatric exam: Normal Affect, Normal Mood





- Skin


Skin Exam: Dry, Intact, Normal Color, Warm


Additional comments: 





pressure dressing in place over RLQ surgical site





Assessment and Plan





- Assessment and Plan (Free Text)


Assessment: 





22M POD#2 s/p diagnostic laparoscopy with resection of Meckel's diverticulum, 

with RRT yesterday due to bleeding. 





Plan: 





Regular diet


PO pain meds


OOBTC


Ambulate


Encourage IS use


D/C IVF


Cont pain meds


Hgb 9.9 from 11


possible partially diluational


FU AM labs





DW attending





Chiqui, PGY-1

## 2018-01-05 NOTE — PCM.RRT
RRT Nurse Assessment





- Situation


RRT Reason for Call: Hypotension


RRT Called By: RN





- IV


IV Inserted during RRT?: No





- Respiratory


Oxygen Delivery Method: Room Air


CPR started during RRT?: No





- Recommendations


RRT Level of Care Recommendations: Remain in current setting





- Respiratory


Oxygen Delivery Method: Room Air





Plan





- Assessment of Findings&Treatment Plan





RRT TIME 05:38


RRT LOCATION 663-2


RRT ARRIVAL RRT REASON: bleeding from surgical wound





S





O


RRT VITALS


T: 99 BP: 146/81 HR:83 


GEN: alert and oriented


CARDIAC: s1s2 no murmurs


RESP: clear bilaterally no wheezing


ABDO: surgical wound at LLQ.





RRT INTERVENTION: Dr. Tomas was present to ensure stability of patient. Dr. Gamez arrived to evaluate surgical site. Lidocaine 1% with epi was locally 

injected and suture placed for hemostasis. 


-Type and screen








A/P 23yo M s/p laparoscopy with bowel resection on 01/03/2018.





RRT OUTCOME





RRT END 05:45


RRT LEADER: Dr. Oralia Gamez


RRT RESIDENTS: Dr. Florian, PGY1 and Dr. Castro, PGY2

## 2018-01-06 VITALS
HEART RATE: 78 BPM | RESPIRATION RATE: 18 BRPM | SYSTOLIC BLOOD PRESSURE: 130 MMHG | DIASTOLIC BLOOD PRESSURE: 73 MMHG | TEMPERATURE: 99.2 F

## 2018-01-06 LAB
ALBUMIN SERPL-MCNC: 3.4 G/DL (ref 3.5–5)
ALBUMIN/GLOB SERPL: 1.3 {RATIO} (ref 1–2.1)
ALT SERPL-CCNC: 36 U/L (ref 21–72)
AST SERPL-CCNC: 14 U/L (ref 17–59)
BUN SERPL-MCNC: 9 MG/DL (ref 9–20)
CALCIUM SERPL-MCNC: 8.5 MG/DL (ref 8.4–10.2)
ERYTHROCYTE [DISTWIDTH] IN BLOOD BY AUTOMATED COUNT: 12.4 % (ref 11.5–14.5)
GFR NON-AFRICAN AMERICAN: > 60
HGB BLD-MCNC: 9.6 G/DL (ref 12–18)
MCH RBC QN AUTO: 31.4 PG (ref 27–31)
MCHC RBC AUTO-ENTMCNC: 36.5 G/DL (ref 33–37)
MCV RBC AUTO: 86 FL (ref 80–94)
PLATELET # BLD: 217 K/UL (ref 130–400)
RBC # BLD AUTO: 3.07 MIL/UL (ref 4.4–5.9)
WBC # BLD AUTO: 10.1 K/UL (ref 4.8–10.8)

## 2018-01-06 NOTE — CP.PCM.PN
Subjective





- Date & Time of Evaluation


Date of Evaluation: 01/06/18


Time of Evaluation: 09:00





- Subjective


Subjective: 


General Surgery


Dr. Menendez





Pt S&E @bedside. NAEO. Pt reports (+)BM and flatus overnight. Pt is very hungry 

and requesting more substantial food. Pt denies F/C, N/V. Pt is concerned about 

some scrotal swelling that occured post-operatively. Pt states swelling has 

improved since applying ice to the area but has not resolved completely. 





Objective





- Vital Signs/Intake and Output


Vital Signs (last 24 hours): 


 











Temp Pulse Resp BP Pulse Ox


 


 99.2 F   78   18   130/73   100 


 


 01/06/18 08:50  01/06/18 08:50  01/06/18 08:50  01/06/18 08:50  01/06/18 08:50











- Medications


Medications: 


 Current Medications





Ondansetron HCl (Zofran Inj)  4 mg IVP Q6 PRN


   PRN Reason: Nausea/Vomiting


   Last Admin: 01/05/18 22:32 Dose:  4 mg


Oxycodone/Acetaminophen (Percocet 5/325 Mg Tab)  1 tab PO Q4 PRN


   PRN Reason: Pain, moderate (4-7)


   Stop: 01/06/18 13:32


   Last Admin: 01/05/18 22:32 Dose:  1 tab











- Labs


Labs: 


 





 01/06/18 05:30 





 01/06/18 05:30 





 











PT  11.0 Seconds (9.8-13.1)   12/30/17  08:25    


 


INR  1.0  (0.9-1.2)   12/30/17  08:25    


 


APTT  28.0 Seconds (25.6-37.1)   12/30/17  08:25    














- Constitutional


Appears: Non-toxic, No Acute Distress





- Head Exam


Head Exam: NORMAL INSPECTION





- Eye Exam


Eye Exam: Normal appearance





- ENT Exam


ENT Exam: Mucous Membranes Moist





- Neck Exam


Neck Exam: Normal Inspection





- Respiratory Exam


Respiratory Exam: NORMAL BREATHING PATTERN.  absent: Accessory Muscle Use, 

Respiratory Distress





- Cardiovascular Exam


Cardiovascular Exam: absent: Bradycardia, Tachycardia





- GI/Abdominal Exam


GI & Abdominal Exam: Soft, Tenderness (mild betty-incisional TTP).  absent: 

Distended, Guarding, Rebound


Additional comments: 





dressings c/d/i


ecchymosis present over L ASIS/iliac crest





-  Exam


 Exam: Scrotal Swelling.  absent: Testicular Tenderness





- Extremities Exam


Extremities Exam: Normal Inspection





- Neurological Exam


Neurological Exam: Alert, Awake, Oriented x3





- Psychiatric Exam


Psychiatric exam: Normal Affect, Normal Mood





- Skin


Skin Exam: Dry, Warm





Assessment and Plan





- Assessment and Plan (Free Text)


Assessment: 





23 y/o M POD#3 s/p diagnostic laparoscopy w/ resection of Meckel's diverticulum 

now c/o scrotal swelling





- ADAT


- cont pain management


- encourage OOB to chair/Amb/IS use


- consider urology consult if swelling persists


- cleared for discharge once tolerating regular diet. 





Pt discussed w/ Dr. Shon Espino DO PGY2

## 2018-01-06 NOTE — CP.PCM.DIS
Provider





- Provider


Date of Admission: 


12/30/17 10:27





Attending physician: 


Kishor Calles DO





Primary care physician: 





DR Ibarra


Consults: 





Surgery: Dr Menendez


GI : Dr Holliday


Time Spent in preparation of Discharge (in minutes): 25





Diagnosis





- Discharge Diagnosis


(1) SBO (small bowel obstruction)


Status: Acute   





(2) Meckels diverticulum


Status: Acute   





(3) Leukocytosis


Status: Acute   





Hospital Course





- Lab Results


Lab Results: 


 Most Recent Lab Values











WBC  10.1 K/uL (4.8-10.8)   01/06/18  05:30    


 


RBC  3.07 Mil/uL (4.40-5.90)  L  01/06/18  05:30    


 


Hgb  9.6 g/dL (12.0-18.0)  L  01/06/18  05:30    


 


Hct  26.4 % (35.0-51.0)  L  01/06/18  05:30    


 


MCV  86.0 fl (80.0-94.0)   01/06/18  05:30    


 


MCH  31.4 pg (27.0-31.0)  H  01/06/18  05:30    


 


MCHC  36.5 g/dL (33.0-37.0)   01/06/18  05:30    


 


RDW  12.4 % (11.5-14.5)   01/06/18  05:30    


 


Plt Count  217 K/uL (130-400)   01/06/18  05:30    


 


MPV  8.3 fl (7.2-11.7)   01/03/18  05:45    


 


Neut % (Auto)  77.7 % (50.0-75.0)  H  01/03/18  05:45    


 


Lymph % (Auto)  12.0 % (20.0-40.0)  L  01/03/18  05:45    


 


Mono % (Auto)  9.2 % (0.0-10.0)   01/03/18  05:45    


 


Eos % (Auto)  0.2 % (0.0-4.0)   01/03/18  05:45    


 


Baso % (Auto)  0.9 % (0.0-2.0)   01/03/18  05:45    


 


Neut #  8.7 K/uL (1.8-7.0)  H  01/03/18  05:45    


 


Lymph #  1.3 K/uL (1.0-4.3)   01/03/18  05:45    


 


Mono #  1.0 K/uL (0.0-0.8)  H  01/03/18  05:45    


 


Eos #  0.0 K/uL (0.0-0.7)   01/03/18  05:45    


 


Baso #  0.1 K/uL (0.0-0.2)   01/03/18  05:45    


 


Neutrophils % (Manual)  85 % (42-75)  H  12/30/17  08:25    


 


Band Neutrophils %  1 % (0-2)   12/30/17  08:25    


 


Lymphocytes % (Manual)  10 % (20-50)  L  12/30/17  08:25    


 


Monocytes % (Manual)  4 % (0-10)   12/30/17  08:25    


 


Platelet Estimate  Normal  (NORMAL)   12/30/17  08:25    


 


Large Platelets  Present   12/30/17  08:25    


 


Giant Platelets  Present   12/30/17  08:25    


 


PT  11.0 Seconds (9.8-13.1)   12/30/17  08:25    


 


INR  1.0  (0.9-1.2)   12/30/17  08:25    


 


APTT  28.0 Seconds (25.6-37.1)   12/30/17  08:25    


 


Sodium  132 mmol/l (132-148)   01/06/18  05:30    


 


Potassium  3.7 MMOL/L (3.6-5.0)   01/06/18  05:30    


 


Chloride  94 mmol/L ()  L  01/06/18  05:30    


 


Carbon Dioxide  30 mmol/L (22-30)   01/06/18  05:30    


 


Anion Gap  12  (10-20)   01/06/18  05:30    


 


BUN  9 mg/dl (9-20)   01/06/18  05:30    


 


Creatinine  0.6 mg/dl (0.8-1.5)  L  01/06/18  05:30    


 


Est GFR ( Amer)  > 60   01/06/18  05:30    


 


Est GFR (Non-Af Amer)  > 60   01/06/18  05:30    


 


POC Glucose (mg/dL)  105 mg/dL ()   01/05/18  05:41    


 


Random Glucose  112 mg/dL ()  H  01/06/18  05:30    


 


Calcium  8.5 mg/dL (8.4-10.2)   01/06/18  05:30    


 


Total Bilirubin  1.1 mg/dl (0.2-1.3)   01/06/18  05:30    


 


AST  14 U/L (17-59)  L  01/06/18  05:30    


 


ALT  36 U/L (21-72)   01/06/18  05:30    


 


Alkaline Phosphatase  47 U/L ()   01/06/18  05:30    


 


Total Protein  6.0 G/DL (6.3-8.2)  L  01/06/18  05:30    


 


Albumin  3.4 g/dL (3.5-5.0)  L  01/06/18  05:30    


 


Globulin  2.6 gm/dL (2.2-3.9)   01/06/18  05:30    


 


Albumin/Globulin Ratio  1.3  (1.0-2.1)   01/06/18  05:30    


 


Lipase  57 U/L ()   12/30/17  08:25    


 


Urine Color  Yellow  (YELLOW)   12/30/17  08:25    


 


Urine Clarity  Slighty-cloudy  (Clear)   12/30/17  08:25    


 


Urine pH  6.0  (5.0-8.0)   12/30/17  08:25    


 


Ur Specific Gravity  1.018  (1.003-1.030)   12/30/17  08:25    


 


Urine Protein  Negative mg/dL (NEGATIVE)   12/30/17  08:25    


 


Urine Glucose (UA)  Neg mg/dL (Normal)   12/30/17  08:25    


 


Urine Ketones  Negative mg/dL (NEGATIVE)   12/30/17  08:25    


 


Urine Blood  Negative  (NEGATIVE)   12/30/17  08:25    


 


Urine Nitrate  Negative  (NEGATIVE)   12/30/17  08:25    


 


Urine Bilirubin  Negative  (NEGATIVE)   12/30/17  08:25    


 


Urine Urobilinogen  0.2-1.0 mg/dL (0.2-1.0)   12/30/17  08:25    


 


Ur Leukocyte Esterase  Neg Nicole/uL (Negative)   12/30/17  08:25    


 


Urine Microscopic WBC  1 /hpf (0-5)   12/30/17  08:25    


 


Ur Squamous Epith Cells  1 /hpf (0-5)   12/30/17  08:25    


 


Urine Bacteria  Rare  (<OCC)   12/30/17  08:25    


 


Blood Type  O POSITIVE   01/05/18  06:00    


 


Blood Type Confirm  O POSITIVE   01/05/18  07:45    


 


Antibody Screen  Negative   01/05/18  06:00    


 


BBK History Checked  No verified bt   01/05/18  06:00    














- Hospital Course


Hospital Course: 





23 y/o male with no significant PMH, except for 1 episode of  SBO  almost 10 

years ago, came in because  of abdominal , pain accompanied by nausea and 

vomiting. 


CT of abdomen showed  Mechanical small bowel obstruction. Patient was admitted 

in med/surg, kept NPO , started IVF, pain medication , NGT placed . Surgery and 

GI consulted


NGT  accidentally came out and patient refused to have it re inserted . S/p 

diagnostic Laparoscopy  1/3/18 : Meckel diverticulum resection done .


Diet started , tolerated Regular diet.  Pain is controlled, passing flatus and 

had BM.  Claered by Surgery for discharge  





1. SBO secondary to Meckel's diverticulum 


s/p diagnostic laparascopy and Meckel diverticulum resection 


passing flatus  and had 3 BM


 tolerating  Regular diet


Promote ambulation 


IVF and pain management


incentive spirometry  


d/c home , ff up with Dr Menendez in 1-2 wks





2. Leukocytosis prob reactive, resolved


pt afebrile








3. DVt porophylaxis


SCD


Pt is ambulatory





Discharge Exam





- Head Exam


Head Exam: ATRAUMATIC, NORMAL INSPECTION, NORMOCEPHALIC





- Eye Exam


Eye Exam: EOMI, Normal appearance, PERRL


Pupil Exam: NORMAL ACCOMODATION, PERRL





- ENT Exam


ENT Exam: Mucous Membranes Moist, Normal Exam, Normal External Ear Exam





- Neck Exam


Neck exam: Full Rom, Normal Inspection





- Respiratory Exam


Respiratory Exam: NORMAL BREATHING PATTERN.  absent: Rales, Rhonchi, Wheezes, 

Respiratory Distress





- Cardiovascular Exam


Cardiovascular Exam: REGULAR RHYTHM, +S1, +S2





- GI/Abdominal Exam


GI & Abdominal Exam: Normal Bowel Sounds, Soft





-  Exam


 Exam: NORMAL INSPECTION





- Extremities Exam


Extremities exam: full ROM, normal capillary refill, pedal pulses present


Additional comments: 





no calf tenderness, neg Homans





- Back Exam


Back exam: FULL ROM.  absent: CVA tenderness (L), CVA tenderness (R), vertebral 

tenderness





- Neurological Exam


Neurological exam: Alert, CN II-XII Intact, Normal Gait, Oriented x3, Reflexes 

Normal





- Psychiatric Exam


Psychiatric exam: Normal Affect, Normal Mood





- Skin


Skin Exam: Dry, Intact, Normal Color, Warm





Discharge Plan





- Follow Up Plan


Condition: GOOD


Disposition: HOME/ ROUTINE


Instructions:  Bowel Obstruction (DC)


Additional Instructions: 


ff up with Surgery - Dr Menendez in 1 wk


ff up with Dr Ibarra asap


Referrals: 


González Ibarra [Staff Provider] - 


Hao Menendez MD [Staff Provider] -